# Patient Record
Sex: MALE | Race: BLACK OR AFRICAN AMERICAN
[De-identification: names, ages, dates, MRNs, and addresses within clinical notes are randomized per-mention and may not be internally consistent; named-entity substitution may affect disease eponyms.]

---

## 2018-09-13 ENCOUNTER — HOSPITAL ENCOUNTER (INPATIENT)
Dept: HOSPITAL 92 - ERS | Age: 53
LOS: 15 days | Discharge: HOME HEALTH | DRG: 958 | End: 2018-09-28
Attending: SURGERY | Admitting: SURGERY
Payer: COMMERCIAL

## 2018-09-13 VITALS — BODY MASS INDEX: 40.4 KG/M2

## 2018-09-13 DIAGNOSIS — S71.112A: ICD-10-CM

## 2018-09-13 DIAGNOSIS — I50.32: ICD-10-CM

## 2018-09-13 DIAGNOSIS — G89.11: ICD-10-CM

## 2018-09-13 DIAGNOSIS — T79.6XXA: ICD-10-CM
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
## 2018-09-13 DIAGNOSIS — I51.7: ICD-10-CM

## 2018-09-13 DIAGNOSIS — Z23: ICD-10-CM

## 2018-09-13 DIAGNOSIS — S77.12XA: Primary | ICD-10-CM
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
## 2018-09-13 DIAGNOSIS — N17.9: ICD-10-CM

## 2018-09-13 DIAGNOSIS — I82.432: ICD-10-CM

## 2018-09-13 DIAGNOSIS — F17.220: ICD-10-CM

## 2018-09-13 DIAGNOSIS — R31.9: ICD-10-CM
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
## 2018-09-13 DIAGNOSIS — I11.0: ICD-10-CM

## 2018-09-13 LAB
ALBUMIN SERPL BCG-MCNC: 4.1 G/DL (ref 3.5–5)
ALP SERPL-CCNC: 67 U/L (ref 40–150)
ALT SERPL W P-5'-P-CCNC: 36 U/L (ref 8–55)
ANION GAP SERPL CALC-SCNC: 14 MMOL/L (ref 10–20)
ANION GAP SERPL CALC-SCNC: 14 MMOL/L (ref 10–20)
APTT PPP: 24.5 SEC (ref 22.9–36.1)
AST SERPL-CCNC: 30 U/L (ref 5–34)
BASOPHILS # BLD AUTO: 0 THOU/UL (ref 0–0.2)
BASOPHILS # BLD AUTO: 0.1 THOU/UL (ref 0–0.2)
BASOPHILS NFR BLD AUTO: 0.2 % (ref 0–1)
BASOPHILS NFR BLD AUTO: 0.7 % (ref 0–1)
BILIRUB SERPL-MCNC: 0.6 MG/DL (ref 0.2–1.2)
BUN SERPL-MCNC: 17 MG/DL (ref 8.4–25.7)
BUN SERPL-MCNC: 17 MG/DL (ref 8.4–25.7)
CALCIUM SERPL-MCNC: 8.8 MG/DL (ref 7.8–10.44)
CALCIUM SERPL-MCNC: 9.1 MG/DL (ref 7.8–10.44)
CHLORIDE SERPL-SCNC: 108 MMOL/L (ref 98–107)
CHLORIDE SERPL-SCNC: 109 MMOL/L (ref 98–107)
CK MB SERPL-MCNC: 6.3 NG/ML (ref 0–6.6)
CK MB SERPL-MCNC: 7.4 NG/ML (ref 0–6.6)
CK MB SERPL-MCNC: 7.7 NG/ML (ref 0–6.6)
CK SERPL-CCNC: 708 U/L (ref 30–200)
CO2 SERPL-SCNC: 22 MMOL/L (ref 22–29)
CO2 SERPL-SCNC: 22 MMOL/L (ref 22–29)
COMM CRITICAL RESULTS DOC: (no result)
CREAT CL PREDICTED SERPL C-G-VRATE: 0 ML/MIN (ref 70–130)
CREAT CL PREDICTED SERPL C-G-VRATE: 0 ML/MIN (ref 70–130)
EOSINOPHIL # BLD AUTO: 0 THOU/UL (ref 0–0.7)
EOSINOPHIL # BLD AUTO: 0.1 THOU/UL (ref 0–0.7)
EOSINOPHIL NFR BLD AUTO: 0.2 % (ref 0–10)
EOSINOPHIL NFR BLD AUTO: 1.2 % (ref 0–10)
GLOBULIN SER CALC-MCNC: 2.8 G/DL (ref 2.4–3.5)
GLUCOSE SERPL-MCNC: 120 MG/DL (ref 70–105)
GLUCOSE SERPL-MCNC: 135 MG/DL (ref 70–105)
HGB BLD-MCNC: 13.7 G/DL (ref 14–18)
HGB BLD-MCNC: 14 G/DL (ref 14–18)
INR PPP: 1
LYMPHOCYTES # BLD: 1.9 THOU/UL (ref 1.2–3.4)
LYMPHOCYTES # BLD: 3.4 THOU/UL (ref 1.2–3.4)
LYMPHOCYTES NFR BLD AUTO: 11.2 % (ref 21–51)
LYMPHOCYTES NFR BLD AUTO: 35.7 % (ref 21–51)
MCH RBC QN AUTO: 28.6 PG (ref 27–31)
MCH RBC QN AUTO: 29 PG (ref 27–31)
MCV RBC AUTO: 86.8 FL (ref 78–98)
MCV RBC AUTO: 87.2 FL (ref 78–98)
MONOCYTES # BLD AUTO: 0.6 THOU/UL (ref 0.11–0.59)
MONOCYTES # BLD AUTO: 1.2 THOU/UL (ref 0.11–0.59)
MONOCYTES NFR BLD AUTO: 6.6 % (ref 0–10)
MONOCYTES NFR BLD AUTO: 7.3 % (ref 0–10)
NEUTROPHILS # BLD AUTO: 13.6 THOU/UL (ref 1.4–6.5)
NEUTROPHILS # BLD AUTO: 5.3 THOU/UL (ref 1.4–6.5)
NEUTROPHILS NFR BLD AUTO: 55.8 % (ref 42–75)
NEUTROPHILS NFR BLD AUTO: 81.1 % (ref 42–75)
PLATELET # BLD AUTO: 192 THOU/UL (ref 130–400)
PLATELET # BLD AUTO: 199 THOU/UL (ref 130–400)
POTASSIUM SERPL-SCNC: 3.4 MMOL/L (ref 3.5–5.1)
POTASSIUM SERPL-SCNC: 3.8 MMOL/L (ref 3.5–5.1)
PROTHROMBIN TIME: 13.3 SEC (ref 12–14.7)
RBC # BLD AUTO: 4.73 MILL/UL (ref 4.7–6.1)
RBC # BLD AUTO: 4.9 MILL/UL (ref 4.7–6.1)
SODIUM SERPL-SCNC: 140 MMOL/L (ref 136–145)
SODIUM SERPL-SCNC: 142 MMOL/L (ref 136–145)
TROPONIN I SERPL DL<=0.01 NG/ML-MCNC: 0.1 NG/ML (ref ?–0.03)
TROPONIN I SERPL DL<=0.01 NG/ML-MCNC: 0.12 NG/ML (ref ?–0.03)
TROPONIN I SERPL DL<=0.01 NG/ML-MCNC: 0.12 NG/ML (ref ?–0.03)
WBC # BLD AUTO: 16.8 THOU/UL (ref 4.8–10.8)
WBC # BLD AUTO: 9.5 THOU/UL (ref 4.8–10.8)

## 2018-09-13 PROCEDURE — 76942 ECHO GUIDE FOR BIOPSY: CPT

## 2018-09-13 PROCEDURE — 37191 INS ENDOVAS VENA CAVA FILTR: CPT

## 2018-09-13 PROCEDURE — 85018 HEMOGLOBIN: CPT

## 2018-09-13 PROCEDURE — 84100 ASSAY OF PHOSPHORUS: CPT

## 2018-09-13 PROCEDURE — 93970 EXTREMITY STUDY: CPT

## 2018-09-13 PROCEDURE — 83735 ASSAY OF MAGNESIUM: CPT

## 2018-09-13 PROCEDURE — 86850 RBC ANTIBODY SCREEN: CPT

## 2018-09-13 PROCEDURE — 90715 TDAP VACCINE 7 YRS/> IM: CPT

## 2018-09-13 PROCEDURE — 93005 ELECTROCARDIOGRAM TRACING: CPT

## 2018-09-13 PROCEDURE — 90732 PPSV23 VACC 2 YRS+ SUBQ/IM: CPT

## 2018-09-13 PROCEDURE — 96374 THER/PROPH/DIAG INJ IV PUSH: CPT

## 2018-09-13 PROCEDURE — 96361 HYDRATE IV INFUSION ADD-ON: CPT

## 2018-09-13 PROCEDURE — 86900 BLOOD TYPING SEROLOGIC ABO: CPT

## 2018-09-13 PROCEDURE — 96375 TX/PRO/DX INJ NEW DRUG ADDON: CPT

## 2018-09-13 PROCEDURE — 85049 AUTOMATED PLATELET COUNT: CPT

## 2018-09-13 PROCEDURE — 36415 COLL VENOUS BLD VENIPUNCTURE: CPT

## 2018-09-13 PROCEDURE — 85610 PROTHROMBIN TIME: CPT

## 2018-09-13 PROCEDURE — 80053 COMPREHEN METABOLIC PANEL: CPT

## 2018-09-13 PROCEDURE — 85025 COMPLETE CBC W/AUTO DIFF WBC: CPT

## 2018-09-13 PROCEDURE — G0009 ADMIN PNEUMOCOCCAL VACCINE: HCPCS

## 2018-09-13 PROCEDURE — 86901 BLOOD TYPING SEROLOGIC RH(D): CPT

## 2018-09-13 PROCEDURE — 84484 ASSAY OF TROPONIN QUANT: CPT

## 2018-09-13 PROCEDURE — 96376 TX/PRO/DX INJ SAME DRUG ADON: CPT

## 2018-09-13 PROCEDURE — 85014 HEMATOCRIT: CPT

## 2018-09-13 PROCEDURE — 80048 BASIC METABOLIC PNL TOTAL CA: CPT

## 2018-09-13 PROCEDURE — 71045 X-RAY EXAM CHEST 1 VIEW: CPT

## 2018-09-13 PROCEDURE — 93306 TTE W/DOPPLER COMPLETE: CPT

## 2018-09-13 PROCEDURE — G0390 TRAUMA RESPONS W/HOSP CRITI: HCPCS

## 2018-09-13 PROCEDURE — 85730 THROMBOPLASTIN TIME PARTIAL: CPT

## 2018-09-13 PROCEDURE — 82550 ASSAY OF CK (CPK): CPT

## 2018-09-13 PROCEDURE — 82553 CREATINE MB FRACTION: CPT

## 2018-09-13 PROCEDURE — A4216 STERILE WATER/SALINE, 10 ML: HCPCS

## 2018-09-13 PROCEDURE — 90471 IMMUNIZATION ADMIN: CPT

## 2018-09-13 PROCEDURE — C1769 GUIDE WIRE: HCPCS

## 2018-09-13 PROCEDURE — 0KDR0ZZ EXTRACTION OF LEFT UPPER LEG MUSCLE, OPEN APPROACH: ICD-10-PCS | Performed by: ORTHOPAEDIC SURGERY

## 2018-09-13 RX ADMIN — Medication SCH GM: at 21:53

## 2018-09-13 RX ADMIN — DOCUSATE SODIUM 50 MG AND SENNOSIDES 8.6 MG SCH: 8.6; 5 TABLET, FILM COATED ORAL at 21:49

## 2018-09-13 RX ADMIN — DOCUSATE SODIUM 50 MG AND SENNOSIDES 8.6 MG SCH TAB: 8.6; 5 TABLET, FILM COATED ORAL at 16:28

## 2018-09-13 RX ADMIN — Medication PRN ML: at 21:50

## 2018-09-13 RX ADMIN — Medication PRN ML: at 21:53

## 2018-09-13 RX ADMIN — ASPIRIN SCH MG: 81 TABLET ORAL at 21:48

## 2018-09-13 NOTE — RAD
CHEST 1 VIEW:

 

Date:  09/13/18 

 

HISTORY:  

Preop. 

 

COMPARISON:  

None. 

 

FINDINGS:

The lungs are hypoinflated with vascular crowding. Heart size is enlarged. 

 

There is a linear density projecting in the right hemithorax, may be outside the patient. 

 

IMPRESSION: 

Linear radiodensity projecting in right hemithorax, not definitively within the patient. Recommend re
peat 2 views of chest. 

 

 

POS: [facility]

## 2018-09-13 NOTE — PDOC.EVN
Event Note





- Event Note


Event Note: 





Returned from OR and evaluated.  


Has wound vac in place


SPo2 is 99%, no reported CP and is hungry now. 


IS at bedside, encouraged use


Has full sensation of the extremity. 


Wants to eat, stable vitals. 





Will get AM cbc, bmp and ck for tomorrow. 


Suspend Toradol for now given renal function today


Ct. with pain control


Will transfer from tele soon


No family at bedside to update.

## 2018-09-13 NOTE — CON
DATE OF CONSULTATION:  09/13/2018

 

CHIEF COMPLAINT:  Left leg injury.

 

HISTORY OF PRESENT ILLNESS:  Mr. Bearden is a 53-year-old male who was caught in machinery at work 
last night.  He had a crushing type injury from an industrial machine.  He had a deep laceration over
 lateral and anterior thigh.  He was taken to the emergency department.  He has had a dressing placed
 with Betadine soaked gauze.  He has been admitted to the hospital.  He has had pain control and anti
biotic treatment.  He is resting comfortably.  Last night he was having chest pain and arm pain as we
ll.  He had an elevated troponin.  He reports having intermittent chest pain over the last 1 month.  
He is n.p.o. in preparation for surgery for the leg  later today.  There was no arterial bleeding per
 report and he has been neurologically intact distally.

 

ALLERGIES:  None.

 

MEDICATIONS:  None.

 

PAST MEDICAL HISTORY:  Hypertension.

 

PAST SURGICAL HISTORY:  None.

 

SOCIAL HISTORY:  The patient works as a .  He drinks alcohol as well as uses smokeless tobacco,
 no drug use.

 

FAMILY MEDICAL HISTORY:  Aneurysm.

 

REVIEW OF SYSTEMS:  Positive for left leg pain, otherwise negative 10-point review of systems.

 

PHYSICAL EXAMINATION:

VITAL SIGNS:  Temperature is 99.6, pulse is 82, respiratory rate 20, oxygen saturation is 97%, blood 
pressure 136/62.

GENERAL:  He is alert, lying supine in no apparent distress.

RESPIRATORY:  Breathing comfortably.

ABDOMEN:  Soft, nontender, nondistended.

CARDIOVASCULAR:  Pulses palpable and regular distally.

MUSCULOSKELETAL:  The patient's left leg has a bandage over the thigh.  This was not fully taken down
.  He does have a deep laceration involving the lateral and anterior thigh.  Possible tendon or quadr
iceps involvement.  This tracks deeply to the bony level.  He is neurovascularly intact distally.  He
 has a palpable dorsalis pedis pulse.  He is able flex and extend the foot and ankle.  He feels bal
l sensation in the foot.

 

LABORATORY AND X-RAY FINDINGS:  The patient has a troponin of 0.125.  Coagulation studies are normal.
  Hemoglobin 13.7.

 

IMPRESSION:  Left thigh laceration with recent chest pain and slightly elevated troponin.

 

PLAN:  At this point, the patient will need surgical intervention.  He will need irrigation and debri
mike of his wounds and possible wound closure versus further surgical intervention in the future if
 he has significant contamination.  We are waiting on cardiac evaluation for this.  He does have a sl
ightly elevated troponin and we would like to trend this.  He may need an echocardiogram.  Once he is
 cleared, we will take him urgently to surgery to irrigate his wounds.  He is at risk for infection o
r other complication.  I have reviewed this with him and he wants to proceed.  He is having no active
 chest pain currently.  His pain is controlled.  He will have antibiotic prophylaxis and DVT prophyla
xis.

## 2018-09-13 NOTE — RAD
LEFT FEMUR 2 VIEWS:

 

HISTORY: 

Trauma.

 

COMPARISON: 

None.

 

FINDINGS: 

There is a large degloving injury over the medial thigh and anterior thigh.  No underling osseous def
ect is appreciated.

 

IMPRESSION: 

Large degloving injury of the medial and anterior soft tissues of the thigh.

 

POS: [facility]

## 2018-09-13 NOTE — HP
DATE OF ADMISSION:  2018

 

ATTENDING PHYSICIAN:  Fran Bustillo M.D.

 

TRAUMA ACTIVATION:  Level 2.

 

HISTORY OF PRESENT ILLNESS:  Stan Bearden is a 53-year-old male, who 
presented to [place] [facility] via EMS, status post accidental crush 
injury from an industrial machinery.  The patient is a  at [employer] where he was working this evening on a project.  His leg was crushed and 
cut by one of the pieces of machinery there.  He was brought to the emergency 
room and found to have a large left thigh laceration without any obvious major 
vessel or bony injury.  Orthopedic Surgery was notified and Trauma services was 
asked to admit.  Upon my evaluation, the patient has a chief complaint of 6/10 
left thigh pain.  He is neurovascularly intact distal to the site of his 
injury.  Orthopedic Surgery plans for operative intervention later this 
morning.  He has received tetanus and Ancef in the emergency room.

 

ALLERGIES:  None.

 

HOME MEDICATIONS:  Patient does not currently take any home medications.

 

CHRONIC MEDICAL ILLNESSES:  Hypertension.

 

PAST SURGICAL HISTORY:  The patient denies.

 

SOCIAL HISTORY:  The patient is a .  He endorses drinking up to one fifth 
of liquor on occasional weekends.  He currently dips.  He denies illicit drug 
use.

 

FAMILY HISTORY:  Significant for mother  from intracerebral aneurysm 
and hypertension.

 

REVIEW OF SYSTEMS:  Ten-point review of systems was performed and negative 
except as indicated in the HPI.

 

PHYSICAL EXAMINATION:

VITAL SIGNS:  On presentation, blood pressure 194/109, pulse 83, respirations 24
, O2 sat 91% on 2 liters nasal cannula.

GENERAL:  A well-developed male, in no acute distress, resting in bed.

HEAD:  Normocephalic, atraumatic.

EYES:  Pupils are PERRLA.  Extraocular movements are intact.

NECK:  Supple.  Trachea is midline.  No midline tenderness to palpation.

CHEST:  Atraumatic, nontender to palpation.  Normal work of breathing.  
Symmetric rise.

CARDIOVASCULAR:  Regular rate and rhythm, no obvious murmurs, rubs, or gallops.

GASTROINTESTINAL:  Abdomen is atraumatic, soft, nontender, bowel sounds are 
positive.

MUSCULOSKELETAL:  Pelvis is stable.

BACK EXAM:  Reported as being within normal limits.

EXTREMITIES:  Bilateral upper extremities within normal limits.  Right lower 
extremity within normal limits.  Left lower extremity has a large 25 x 15 cm 
soft tissue laceration.  There is no obvious contamination.  He is 
neurovascularly intact distal to the site of his injury.

NEUROLOGIC:  GCS is 15.  No focal deficit noted.

 

LABORATORY FINDINGS:  WBC 9.5, hemoglobin 14.0, hematocrit 42.5, platelet count 
199.  INR 1.0.  Sodium 142, potassium 3.4, chloride 109, carbon dioxide 22, BUN 
17, creatinine 1.51, glucose 135, AST and ALT within normal limits.  .  
EKG with normal sinus rhythm, nonspecific T-wave changes, and prolonged QT.

 

RADIOGRAPHIC FINDINGS:  Chest x-ray with some cardiomegaly, femur x-ray without 
any obvious bony fracture or dislocation.  Official reads are pending.

 

ASSESSMENT:

1.  Status post industrial machinery accident.

2.  Large soft tissue/left thigh laceration.

3.  Acute traumatic pain.

4.  Hypertension.

5.  Prolonged QT.

6.  Elevated CK.

 

PLAN:

Admit to Trauma services.  The patient should be n.p.o.  Cardiac enzymes for 
nonspecific T-wave changes.  Avoid QT prolonging medications.  Perioperative 
pain management with p.o. and IV analgesics. DVT and gastritis prophylaxis when 
appropriate. Further plan to be delineated once the patient's wound has been 
explored in the operating room.  We will obtain ankle brachial index now to 
rule out any significant vascular injury.  Plan for admission was discussed 
with the patient at bedside and all questions were answered at the time of this 
dictation.  Trauma attending has been notified of admission.

 

LOGAN

## 2018-09-13 NOTE — PRG
DATE OF SERVICE:  09/13/2018

 

DATE OF ADMISSION:  09/13/2018

 

SUBJECTIVE:  Mr. Bearden is admitted overnight for a complex laceration needing surgical debridemen
t by Orthopedic Surgery; however, he did complain of chest pain, noted to have elevated troponin and 
CK prior to arrival.  The patient's troponins have started to downtrend now.  He has no active chest 
pain at this time.  He normally has a normal ADLs and an EF of 65% by echocardiogram with no regional
 wall motion abnormalities today.  The patient does have some grade 2-3 diastolic dysfunction that is
 reported.  The patient was hypertensive on arrival.  He was started on Norvasc and given hydralazine
 and this has since resolved.  The patient is cleared for surgery pending the same.  He still has azul
e pain 8/10 for which morphine has been ordered.  He is currently n.p.o.

 

OBJECTIVE:

VITAL SIGNS:  Currently, temperature is 98.1, blood pressure 123/62.  He is 100% on room air, breathi
ng 19 times a minute with heart rate of 98.  T-max was 100.2 earlier this morning.

GENERAL:  A 53-year-old male lying in bed, in no acute distress.

HEENT:  Normocephalic, atraumatic.

NECK:  Trachea is midline.

RESPIRATORY:  Equal rise and fall.  No respiratory distress.

CARDIOVASCULAR:  Regular rhythm at 98 beats per minute.  Strong pulses in all extremities.  He has se
nsation in all extremities.

ABDOMEN:  Soft and nontender.

EXTREMITIES:  The left extremity has dressing in place.  He does have a sensation distal to his injur
y.  He is able to move all extremities well.

PSYCHIATRIC:  Normal mood and affect.

NEUROLOGIC:  Alert and oriented to person, place, time, and event.  No gross deficits are appreciated
.

 

LABORATORY DATA:  Today shows a white blood cell count of 16.8 with platelets 192, hemoglobin and hem
atocrit 13.7 and 41.3 respectively.  INR is 1.0.  Chemistry shows a creatinine of 1.57, BUN of 17.  C
K was 708, up from 801 on arrival.  Troponins have been 0.123, then 0.125, now 0.103 with a CK-MB xavier
t is downtrending last 6.3.

 

Echocardiogram shows EF of 60%-65%, grade 2-3 diastolic dysfunction, moderate LV hypertrophy.  There 
are some signs that he could have amyloidosis, but no regional wall motion abnormalities.

 

ASSESSMENT:

1.  Status post industrial machinery accident.

2.  Large soft tissue defect to the left thigh with a complex laceration.

3.  Acute traumatic pain.

4.  Hypertension.

5.  Acute diastolic dysfunction.

6.  Acute kidney injury.

7.  Marginally elevated CK.

 

PLAN:

1.  Cleared for surgery.

2.  Continue fluid replacement.

3.  Continue n.p.o. until after surgery.

4.  Increased pain control with morphine and oral agents for now until after surgery.

5.  We will need outpatient follow up for his hypertension and Cardiology.

6.  Change Norvasc and hydralazine to metoprolol 25 mg extended release b.i.d., I have discussed with
 pharmacy.

7.  The beta blocker should assist with a prolonged QT interval on arrival.

8.  GI regimen per protocol.

9.  Prophylaxis will be Pepcid holding chemical DVT for surgery and SCDs to the right leg only.

10.  The patient is a FULL CODE.

11.  Access:  Peripheral IV.

12.  Activity:  Nonweightbearing to the left leg for now.

13.  Diet will be n.p.o. until after surgery.

14.  Disposition:  Can be transferred to a surgical phelan post-surgery.

15.  We have updated the patient and family at the bedside, coordinate care with the Orthopedic team 
and discussed with Echocardiogram and Cardiology team.  This plan is felt to Dr. Sutton and can be upd
ated as needed.

## 2018-09-13 NOTE — CON
DATE OF SERVICE:  09/13/2018

 

REASON FOR CONSULTATION:  Hypertension and abnormal echo.

 

HISTORY OF PRESENT ILLNESS:  Mr. Bearden is a very pleasant 53-year-old  gentleman 
who comes to the hospital for a crush injury to his left leg.  He apparently had his leg caught in an
 industrial machine.  He is a  at [employer].  He was brought in for this and taken to 
the OR earlier today for debridement.  His leg _____ when he has a wound VAC on and this going to [facility] with secondary intention.  During his admission, he voiced complaints of midsternal chest tightness
.  He tells me that he has not had this pain for about 6 months.  He had an episode where he was just
 sitting down similar to this one just chest tightness.  He otherwise takes no medications.  He has katty castañeda told that he has high blood pressure in the past, but he does not take any medications for this.

 

PAST MEDICAL HISTORY:  Hypertension.

 

PAST SURGICAL HISTORY:  None.

 

OUTPATIENT MEDICATIONS:  None.

 

ALLERGIES:  No known drug allergies.

 

SOCIAL HISTORY:  He denies tobacco use; however, he does dip since he was very young.  He drinks abou
t a half bottle of [employer] on the weekends, but none on the weeks.  No drug use.

 

FAMILY HISTORY:  Mother with aneurysm and high blood pressure.

 

REVIEW OF SYSTEMS:  A 12-point review of systems was done and is all negative unless stated in the hi
story of present illness.

 

PHYSICAL EXAMINATION:

VITAL SIGNS:  Temperature as high as 100.2, currently 98.1; pulse 87; respiration rate 18; satting 95
% on room air; blood pressure 154/89.

GENERAL:  Awake, alert, oriented x3, in no distress.

HEENT:  Normocephalic, atraumatic.

NECK:  Supple.

LUNGS:  Lungs are clear.

CARDIOVASCULAR:  S1, S2, no S3, S4, no murmurs.

ABDOMEN:  Soft, positive bowel sounds.

EXTREMITIES:  No edema.

SKIN:  Warm and dry.  Wound VAC on the left leg.

 

LABORATORY WORK:  Reviewed.  White count of 16, hemoglobin of 13, hematocrit 41, platelet count of 19
2.  Coags were reviewed.  Chemistries were reviewed.  Troponins were 0.12, 0.12, and 0.10.  CK-MB at 
7.7, down to 7.4, now at 6.3.  CK-MB was high, currently at 708.  Creatinine 1.5.  Potassium was low 
on admission at 3.4, up to 3.8 now.

 

EKG was reviewed, normal sinus rhythm with LVH.

 

Echocardiogram was reviewed.  He has severe LVH with hyperechoic pattern suggestive of other infiltra
tive diseases.  Differential diagnosis include amyloid sarcoid less likely hemochromatosis.

 

ASSESSMENT AND PLAN:

1.  Hypertension.

2.  Severe LVH and grade II diastolic dysfunction.

3.  Cannot rule out infiltrative diseases.

4.  Crush injury to the left leg, status post debridement.  Wound VAC in place.

5.  Chest pain.

 

PLAN:

1.  Currently, his blood pressure is elevated from multiple factors including pain.  At this time, hi
s pain is better controlled and his blood pressure still in the 150s, so we will plan on starting ant
ihypertensive in the form of lisinopril at 10 mg a day.  If this is not effective, we will switch to 
a diuretic.

2.  In the future, he will need risk stratification with stress testing, but not at this time.  He ha
s to heal his wound for this.

3.  As far as his chest pain is concerned, we will plan on doing a stress test as an outpatient.  He 
may also need a fat pad biopsy in the future.

 

Thank you for letting us participate in the care of your patient.  We will follow.

## 2018-09-14 LAB
ANION GAP SERPL CALC-SCNC: 11 MMOL/L (ref 10–20)
BASOPHILS # BLD AUTO: 0 THOU/UL (ref 0–0.2)
BASOPHILS NFR BLD AUTO: 0.1 % (ref 0–1)
BUN SERPL-MCNC: 25 MG/DL (ref 8.4–25.7)
CALCIUM SERPL-MCNC: 8.3 MG/DL (ref 7.8–10.44)
CHLORIDE SERPL-SCNC: 108 MMOL/L (ref 98–107)
CK SERPL-CCNC: 1677 U/L (ref 30–200)
CO2 SERPL-SCNC: 24 MMOL/L (ref 22–29)
CREAT CL PREDICTED SERPL C-G-VRATE: 93 ML/MIN (ref 70–130)
EOSINOPHIL # BLD AUTO: 0 THOU/UL (ref 0–0.7)
EOSINOPHIL NFR BLD AUTO: 0.1 % (ref 0–10)
GLUCOSE SERPL-MCNC: 107 MG/DL (ref 70–105)
HGB BLD-MCNC: 11.3 G/DL (ref 14–18)
LYMPHOCYTES # BLD: 1.1 THOU/UL (ref 1.2–3.4)
LYMPHOCYTES NFR BLD AUTO: 13.9 % (ref 21–51)
MAGNESIUM SERPL-MCNC: 2.4 MG/DL (ref 1.6–2.6)
MCH RBC QN AUTO: 28.6 PG (ref 27–31)
MCV RBC AUTO: 87.3 FL (ref 78–98)
MONOCYTES # BLD AUTO: 1.1 THOU/UL (ref 0.11–0.59)
MONOCYTES NFR BLD AUTO: 13.1 % (ref 0–10)
NEUTROPHILS # BLD AUTO: 5.9 THOU/UL (ref 1.4–6.5)
NEUTROPHILS NFR BLD AUTO: 72.8 % (ref 42–75)
PLATELET # BLD AUTO: 170 THOU/UL (ref 130–400)
POTASSIUM SERPL-SCNC: 4.3 MMOL/L (ref 3.5–5.1)
RBC # BLD AUTO: 3.96 MILL/UL (ref 4.7–6.1)
SODIUM SERPL-SCNC: 139 MMOL/L (ref 136–145)
WBC # BLD AUTO: 8.1 THOU/UL (ref 4.8–10.8)

## 2018-09-14 RX ADMIN — Medication SCH GM: at 13:48

## 2018-09-14 RX ADMIN — Medication PRN ML: at 11:53

## 2018-09-14 RX ADMIN — ASPIRIN SCH MG: 81 TABLET ORAL at 09:18

## 2018-09-14 RX ADMIN — Medication SCH GM: at 05:52

## 2018-09-14 RX ADMIN — DOCUSATE SODIUM 50 MG AND SENNOSIDES 8.6 MG SCH TAB: 8.6; 5 TABLET, FILM COATED ORAL at 20:49

## 2018-09-14 RX ADMIN — Medication SCH GM: at 20:49

## 2018-09-14 RX ADMIN — DOCUSATE SODIUM 50 MG AND SENNOSIDES 8.6 MG SCH TAB: 8.6; 5 TABLET, FILM COATED ORAL at 09:18

## 2018-09-14 RX ADMIN — ASPIRIN SCH MG: 81 TABLET ORAL at 20:50

## 2018-09-14 NOTE — PRG
DATE OF SERVICE:  09/14/2018

 

SUBJECTIVE:  Mr. Bearden was at his hospital day #1, postop day #1, status 
post crush injury machinery to the left thigh, causing a significant soft 
tissue damage.  The patient was taken to the operating room yesterday with Dr. Wilson from Orthopedic Surgery and had a wound washout.  An irrigating wound 
VAC was in place.  The patient was moved back to the surgical phelan.  Initially, 
was on tele.  Troponins started to downtrend.  Echo showed normal EF with a 
grade 1 to 2 diastolic dysfunction.  He has no active chest pain.  Therefore, 
he was moved to the surgical phelan.  The patient is doing better, tolerating a 
diet, minimal distress.  He is working with PT.  CK was noted to be slightly 
elevated today and creatinine continues to climb just ever so slightly.  The 
patient is tolerating p.o. and producing urine.  Plastic Surgery has been 
consulted.

 

OBJECTIVE:

VITAL SIGNS:  Blood pressure is 146/75, temperature is 98.5, respirations 16, 
pulse is 80.  He is 96% on room air.

GENERAL:  This is a 53-year-old male, slightly obese, sitting up in bed, in no 
acute distress.

HEENT:  Normocephalic, atraumatic.

NECK:  Trachea is midline.

RESPIRATORY:  Equal rise and fall.  No respiratory distress.

CARDIOVASCULAR:  Strong pulses noted.

ABDOMEN:  Soft.

EXTREMITIES:  He has a wound VAC in place to the left thigh.  Does have a 
sensation distal to the wound.  Otherwise, no edema appreciated.

PSYCHIATRIC:  Normal mood and affect.

SKIN:  Pink, warm, and dry.

 

LABORATORY DATA:  Significant today, a white blood cell count has come down to 8
,100, platelets are 170.  Creatinine is 1.63 and CK is 1677.

 

ASSESSMENT AND PLAN:

1.  Status post industrial machinery accident.

2.  Large soft tissue defect to left thigh, status post debridement and wound 
VAC placement.

3.  Acute traumatic pain.

4.  Hypertension, slowly improving.

5.  Acute diastolic dysfunction.

6.  Acute kidney injury.

7.  Mild rhabdomyolysis.

 

PLAN:

1.  Plastic Surgery consult.

2.  Stop medications that are renally metabolized.

3.  Continue pain control.

4.  Continue intravenous fluids for now.

5.  Repeat BMP and CK in the morning.

6.  GI regimen per protocol.

7.  Increase beta blocker to b.i.d. dosing from once daily dosing.

8.  Monitor blood pressure.

9.  Access peripheral IVs.

 

ACTIVITY:  Nonweightbearing on the leg, but will work with PT.  Patient is a 
FULL CODE.

 

DIET:  Regular diet.

 

DISPOSITION:  Continue services on the surgical phelan.

 

Prophylaxis will be aspirin b.i.d., Pepcid renally dosed, and SCDs to the non-
affected leg.

I have updated the patient and the patient's wife at the bedside and answered 
all questions.  I have coordinated care with the nursing staff, the trauma team
, and Orthopedic team.

 

LOGAN

## 2018-09-14 NOTE — OP
DATE OF PROCEDURE:  09/13/2018

 

PREOPERATIVE DIAGNOSIS:  Traumatic laceration, left medial thigh.

 

POSTOPERATIVE DIAGNOSES:  Left medial thigh laceration with laceration of the vastus medialis oblique
 muscle and partial laceration of rectus femoris.

 

PROCEDURE:

1.  Irrigation of left thigh wound.

2.  Application of wound VAC to left thigh.

 

ANESTHESIA:  General.

 

SURGEON:  Ramsey Wilson M.D.

 

ASSISTANT:  Lázaro Bourne PA-C.

 

BLOOD LOSS:  150 mL.

 

IMPLANTS:  None.

 

DRAINS:  Wound VAC x1.

 

COMPLICATIONS:  None.

 

SPECIMEN:  None.

 

OUTCOME FINDINGS:  Irrigated wound found to be free of foreign debris with application of wound VAC.

 

INDICATIONS:  Mr. Bearden is a 53-year-old gentleman involved in a work injury with a large traumat
ic laceration of the left medial thigh, heading deep to the fascia and into the vastus medialis muscl
e belly.  The patient initially was having some chest pain and as such, we had to delay surgery for c
learance.  He has now been cleared and is proceeding to the operating room for irrigation, debridemen
t, exploration of the wound and probable application of wound VAC.  Informed consent has been obtaine
d.  I believe all questions have been answered.

 

DESCRIPTION OF PROCEDURE:  The patient was brought to the operating room and a timeout performed foll
owed by induction of general anesthesia.  The patient was found to have a very large traumatic wound 
of the medial thigh.  This measuring in excess of 14 inches long with a width of approximately 8 inch
es.  On initial inspection following the sterile prep and drape, the patient was found to have expose
d vastus medialis muscle belly with essentially complete transection of the muscle belly proximal to 
the quadriceps tendon.  This laceration then extended to just get the medial most portion of the rect
us femoris.  There did appear to be perhaps some loss of muscle tissue and the fascia was severely fr
ayed to the point where a fascial closure could not be performed over the muscle belly.  The wound wa
s further inspected.  There was no exposed bone.  The majority of the quadriceps tendon was completel
y intact.  I did not encounter any foreign debris.  Next, 5 liters of normal saline with Pulsavac was
 irrigated through the wound.  No sharp debridement was necessary due to the fact that there really w
as no obviously dysvascular tissue.  At the completion of the irrigation and exploration, we opted to
 proceed with placement of a wound VAC.  This due to the fact that the skin edge margins were margina
lly viable, still felt to be bleeding, but it was felt that attempt to closure of this thigh would re
sult in excessive tension of the skin and probable skin breakdown and necrosis, as such a wound VAC w
as applied while in the operating room and then patient was transferred to recovery room in stable co
ndition.  There were no complications.  He tolerated the procedure well.

## 2018-09-14 NOTE — PDOC.CTH
Cardiology Progress Note





- Subjective





He is doing well. Pain well controlled. 





- Objective


 Vital Signs











  Temp Pulse Resp BP Pulse Ox


 


 09/14/18 04:01  98.5 F  80  16  146/75 H  96








 











Weight                         273 lb 14.4 oz














 











 09/13/18 09/14/18 09/15/18





 06:59 06:59 06:59


 


Intake Total 60 854 


 


Output Total 400 720 


 


Balance -340 134 














- Physical Examination


General/Neuro: alert & oriented x3, NAD


Neck: no JVD present


Lungs: CTA, unlabored respirations


Heart: RRR


Abdomen: NT/ND


Extremities: other: (No edema. Good distal pulses.)





- Labs


Result Diagrams: 


 09/17/18 05:21





 09/17/18 05:21


 Troponin/CKMB











CK-MB (CK-2)  6.3 ng/mL (0-6.6)   09/13/18  07:11    


 


Troponin I  0.103 ng/mL (< 0.028)  H  09/13/18  07:11    














- Assessment/Plan





1. HTN, poorly controlled


2. LVH, possible infiltrative disease.


3. S/P crush injury to left leg. 





PLAN:


- Will start HCTZ for BP control. 


- Outpatient cardiac work up.

## 2018-09-15 LAB
ANION GAP SERPL CALC-SCNC: 11 MMOL/L (ref 10–20)
BUN SERPL-MCNC: 19 MG/DL (ref 8.4–25.7)
CALCIUM SERPL-MCNC: 8.3 MG/DL (ref 7.8–10.44)
CHLORIDE SERPL-SCNC: 109 MMOL/L (ref 98–107)
CK SERPL-CCNC: 1537 U/L (ref 30–200)
CO2 SERPL-SCNC: 24 MMOL/L (ref 22–29)
CREAT CL PREDICTED SERPL C-G-VRATE: 125 ML/MIN (ref 70–130)
GLUCOSE SERPL-MCNC: 100 MG/DL (ref 70–105)
POTASSIUM SERPL-SCNC: 3.8 MMOL/L (ref 3.5–5.1)
SODIUM SERPL-SCNC: 140 MMOL/L (ref 136–145)

## 2018-09-15 RX ADMIN — Medication SCH GM: at 06:16

## 2018-09-15 RX ADMIN — Medication SCH GM: at 14:30

## 2018-09-15 RX ADMIN — DOCUSATE SODIUM 50 MG AND SENNOSIDES 8.6 MG SCH: 8.6; 5 TABLET, FILM COATED ORAL at 20:20

## 2018-09-15 RX ADMIN — ASPIRIN SCH MG: 81 TABLET ORAL at 20:14

## 2018-09-15 RX ADMIN — DOCUSATE SODIUM 50 MG AND SENNOSIDES 8.6 MG SCH TAB: 8.6; 5 TABLET, FILM COATED ORAL at 08:58

## 2018-09-15 RX ADMIN — ASPIRIN SCH MG: 81 TABLET ORAL at 08:57

## 2018-09-15 NOTE — PRG
DATE OF SERVICE:  09/15/2018

 

SUBJECTIVE:  The patient is postop day #2 status post crush injury to his left thigh which he underwe
nt excision debridement and wound VAC placement.  The patient is currently on IV antibiotics.  He is 
tolerating a diet.  His pain is controlled.  The patient had no issues overnight.  His CK is trending
 down slightly and his kidney function has improved.

 

PHYSICAL EXAMINATION:

VITAL SIGNS:  Temperature is 99.0, heart rate 85, blood pressure 179/79, respirations 20, oxygen satu
ration 94% on room air.

GENERAL:  The patient is resting comfortably in a chair at bedside.  He is awake, alert, oriented x3.
  [place] coma scale is 15.

HEENT:  Unremarkable.

LUNGS:  Clear to auscultation with good inspiratory and expiratory effort.

HEART:  Regular rate and rhythm.

ABDOMEN:  Soft, flat, nontender with active bowel sounds.

EXTREMITIES:  Neurovascularly intact x4.  Wound VAC to left thigh appears to be functioning properly.


 

ASSESSMENT AND PLAN:

1.  Status post large soft tissue defect of left thigh.

2.  Status post irrigation and debridement, and wound VAC placement of same.

 

Plan will be to continue IV antibiotics today, but they will be discontinued this evening.  Continue 
IV hydration.  We will recheck his CK in the morning.  Continue physical and occupational therapy, pa
in control, pulmonary toilet, gastritis, and mechanical VTE prophylaxis.  Likely the patient will be 
started on chemical VTE prophylaxis tomorrow pending his lab results.

## 2018-09-16 RX ADMIN — DOCUSATE SODIUM 50 MG AND SENNOSIDES 8.6 MG SCH TAB: 8.6; 5 TABLET, FILM COATED ORAL at 19:45

## 2018-09-16 RX ADMIN — ASPIRIN SCH MG: 81 TABLET ORAL at 19:46

## 2018-09-16 RX ADMIN — ASPIRIN SCH MG: 81 TABLET ORAL at 08:42

## 2018-09-16 RX ADMIN — DOCUSATE SODIUM 50 MG AND SENNOSIDES 8.6 MG SCH TAB: 8.6; 5 TABLET, FILM COATED ORAL at 08:42

## 2018-09-17 LAB
ANION GAP SERPL CALC-SCNC: 13 MMOL/L (ref 10–20)
BASOPHILS # BLD AUTO: 0 THOU/UL (ref 0–0.2)
BASOPHILS NFR BLD AUTO: 0.1 % (ref 0–1)
BUN SERPL-MCNC: (no result) MG/DL (ref 8.4–25.7)
CALCIUM SERPL-MCNC: 8.7 MG/DL (ref 7.8–10.44)
CHLORIDE SERPL-SCNC: 104 MMOL/L (ref 98–107)
CK SERPL-CCNC: 1346 U/L (ref 30–200)
CO2 SERPL-SCNC: 23 MMOL/L (ref 22–29)
CREAT CL PREDICTED SERPL C-G-VRATE: 103 ML/MIN (ref 70–130)
EOSINOPHIL # BLD AUTO: 0.1 THOU/UL (ref 0–0.7)
EOSINOPHIL NFR BLD AUTO: 0.9 % (ref 0–10)
GLUCOSE SERPL-MCNC: 90 MG/DL (ref 70–105)
HGB BLD-MCNC: 11.1 G/DL (ref 14–18)
LYMPHOCYTES # BLD: 1.7 THOU/UL (ref 1.2–3.4)
LYMPHOCYTES NFR BLD AUTO: 13.6 % (ref 21–51)
MAGNESIUM SERPL-MCNC: 2.2 MG/DL (ref 1.6–2.6)
MCH RBC QN AUTO: 27.9 PG (ref 27–31)
MCV RBC AUTO: 90 FL (ref 78–98)
MONOCYTES # BLD AUTO: 1.7 THOU/UL (ref 0.11–0.59)
MONOCYTES NFR BLD AUTO: 13.2 % (ref 0–10)
NEUTROPHILS # BLD AUTO: 9.1 THOU/UL (ref 1.4–6.5)
NEUTROPHILS NFR BLD AUTO: 72.2 % (ref 42–75)
PLATELET # BLD AUTO: 228 THOU/UL (ref 130–400)
POTASSIUM SERPL-SCNC: 4 MMOL/L (ref 3.5–5.1)
RBC # BLD AUTO: 3.97 MILL/UL (ref 4.7–6.1)
SODIUM SERPL-SCNC: 136 MMOL/L (ref 136–145)
WBC # BLD AUTO: 12.6 THOU/UL (ref 4.8–10.8)

## 2018-09-17 RX ADMIN — ASPIRIN SCH MG: 81 TABLET ORAL at 20:07

## 2018-09-17 RX ADMIN — ASPIRIN SCH MG: 81 TABLET ORAL at 08:46

## 2018-09-17 RX ADMIN — DOCUSATE SODIUM 50 MG AND SENNOSIDES 8.6 MG SCH: 8.6; 5 TABLET, FILM COATED ORAL at 08:48

## 2018-09-17 RX ADMIN — DOCUSATE SODIUM 50 MG AND SENNOSIDES 8.6 MG SCH TAB: 8.6; 5 TABLET, FILM COATED ORAL at 20:07

## 2018-09-17 NOTE — PDOC.CTH
Cardiology Progress Note





- Subjective





He is doing well. His BP is still high. He is able to walk with a walker to the 

bathroom and has been walking with PT. 





- Objective


 Vital Signs











  Temp Pulse Resp BP BP BP BP


 


 09/17/18 11:21  98.5 F  92  20    157/88 H 


 


 09/17/18 09:10     164/85 H  167/80 H  


 


 09/17/18 07:26  98.5 F  91  18     164/85 H


 


 09/17/18 05:33       


 


 09/17/18 01:00  98.9 F  76  16     165/96 H














  Pulse Ox


 


 09/17/18 11:21  95


 


 09/17/18 09:10 


 


 09/17/18 07:26  96


 


 09/17/18 05:33  97


 


 09/17/18 01:00  97








 











Weight                         273 lb 14.4 oz














 











 09/16/18 09/17/18 09/18/18





 06:59 06:59 06:59


 


Intake Total 4400 4010 


 


Balance 4400 4010 














- Physical Examination


General/Neuro: alert & oriented x3, NAD


Neck: no JVD present


Lungs: unlabored respirations


Heart: RRR


Abdomen: NT/ND


Extremities: + edema B (1+ left leg.)





- Labs


Result Diagrams: 


 09/17/18 05:21





 09/17/18 05:21


 Troponin/CKMB











CK-MB (CK-2)  6.3 ng/mL (0-6.6)   09/13/18  07:11    


 


Troponin I  0.103 ng/mL (< 0.028)  H  09/13/18  07:11    














- Assessment/Plan





1. HTN, poorly controlled


2. LVH, possible infiltrative disease.


3. S/P crush injury to left leg. 





PLAN:


- Increase HCTZ and add nifedipine. 


- Will need fat pad biopsy in the future and stress testing once leg improved.

## 2018-09-17 NOTE — PRG
DATE OF SERVICE:  09/16/2018

 

HOSPITAL COURSE:  The patient is postop day #3 status post crush injury to his left thigh, which he u
nderwent excisional debridement and wound VAC placement.  The patient completed his antibiotic treatm
ent yesterday.  He had no issues overnight.  He has been tolerating a diet.  His pain is controlled, 
and he has worked with physical and occupational therapy.

 

PHYSICAL EXAMINATION:

VITAL SIGNS:  Temperature is 98.6, heart rate 86, blood pressure 151/89, respirations 16, oxygen satu
ration 95% on room air.

GENERAL:  The patient is resting comfortably, sitting in a chair beside his bed.  He is awake, alert,
 and oriented x3.  [place] coma scale is 15.

HEENT:  Unremarkable.

LUNGS:  Clear to auscultation with good inspiratory and expiratory effort.

HEART:  Regular rate and rhythm.

ABDOMEN:  Soft, flat, nontender with active bowel sounds.

EXTREMITIES:  Neurovascularly intact x4.  Wound VAC appears to be functioning correctly.

 

LABORATORY DATA AND X-RAY FINDINGS:  There are no labs or radiographs to review this morning.

 

ASSESSMENT AND PLAN:

1.  Status post large soft tissue defect of the left thigh.

2.  Status post irrigation and debridement and wound VAC placement of same.  We will continue support
sharon care, pain control, and we will work on placement decision.

## 2018-09-17 NOTE — PRG-2
DATE OF SERVICE:  09/17/2018

 

HISTORY:  This is a 53-year-old male status post industrial injury with a left leg laceration.  Posto
p day 4 debridement of that wound.  Currently, has wound VAC.  This morning the patient states that h
is pain is minimal at rest, but significantly worse with palpation or with movement.  The patient den
ies any shortness of breath, chest pain, nausea, vomiting, abdominal pain.

 

OBJECTIVE:

VITAL SIGNS:  Temperature 98.5, pulse 92, respirations 18, O2 saturation 95 on room air, blood pressu
re 157/88.

GENERAL:  The patient is sitting in his chair watching TV in no acute distress.

LUNGS:  Clear to auscultation with no labored breathing.

CARDIOVASCULAR:  Regular rate and rhythm with no murmurs.

ABDOMEN:  Soft, nontender and has active bowel sounds.

EXTREMITIES:  Neurovascularly intact x4.  Wound VAC is in place.  The patient has significant swellin
g on the left leg at the site of the injury with some erythema surrounding.

 

LABORATORY DATA:  WBC 12.5, hemoglobin 11.1, hematocrit 35.7, platelet count 228.  Sodium 136, potass
ium 4.0, chloride 104, bicarbonate 23, BUN less than 4, creatinine 1.48, glucose 90, creatinine kinas
e 1346 down from 1537.

 

ASSESSMENT:

1.  Status post large soft tissue injury to left thigh.

2.  Status post irrigation and debridement with wound VAC placement.

 

PLAN:  We will continue providing pain support and other supportive measures.  The patient will laina
nue working with PT, OT as we find placement for patient.

 

Dr. Sutton saw this patient.  We discussed the treatment plan.

## 2018-09-18 LAB
HGB BLD-MCNC: 10.5 G/DL (ref 14–18)
PLATELET # BLD AUTO: 277 THOU/UL (ref 130–400)

## 2018-09-18 PROCEDURE — 06H03DZ INSERTION OF INTRALUMINAL DEVICE INTO INFERIOR VENA CAVA, PERCUTANEOUS APPROACH: ICD-10-PCS | Performed by: THORACIC SURGERY (CARDIOTHORACIC VASCULAR SURGERY)

## 2018-09-18 RX ADMIN — ASPIRIN SCH: 81 TABLET ORAL at 08:17

## 2018-09-18 RX ADMIN — DOCUSATE SODIUM 50 MG AND SENNOSIDES 8.6 MG SCH: 8.6; 5 TABLET, FILM COATED ORAL at 20:58

## 2018-09-18 RX ADMIN — DOCUSATE SODIUM 50 MG AND SENNOSIDES 8.6 MG SCH: 8.6; 5 TABLET, FILM COATED ORAL at 08:23

## 2018-09-18 RX ADMIN — NIFEDIPINE SCH MG: 30 TABLET, FILM COATED, EXTENDED RELEASE ORAL at 08:19

## 2018-09-18 RX ADMIN — HYDROCODONE BITARTRATE AND ACETAMINOPHEN PRN TAB: 7.5; 325 TABLET ORAL at 20:59

## 2018-09-18 NOTE — CON
DATE OF CONSULTATION:  09/18/2018

 

REQUESTING PHYSICIAN:  Dr. Sutton.

 

CHIEF COMPLAINT:  Deep venous thrombosis with contraindication to anticoagulation.

 

HISTORY OF PRESENT ILLNESS:  The patient is a 53-year-old black man with minimal past medical history
.  Five days ago, he was involved in an industrial accident where there was a crush injury to his lef
t thigh that left him with a large soft tissue defect is currently being managed with wound VAC, but 
plans were for split thickness autologous skin grafting for coverage.  The patient had been having so
me persistent swelling in that leg and an ultrasound demonstrated deep venous thrombosis in the left 
popliteal vein.  Anticoagulation interfered with adherence of the skin graft at the wound site, and I
 have been requested to place a temporary vena cava filter to guard against pulmonary embolism during
 this window of time, during which anticoagulation will be deferred.

 

PAST MEDICAL HISTORY:  Significant for hypertension.

 

MEDICATIONS:  He takes no medicines on a regular basis.  His present medicines include Toprol-XL, hyd
rochlorothiazide, and Procardia-XL as well as pain medicine.

 

ALLERGIES:  He denies any medical allergies.

 

PHYSICAL EXAMINATION:

GENERAL:  He is robust appearing man in no distress.  He has a wound VAC in place in the left thigh. 
 He is 5 feet 9.  Weighs 273 pounds, heart rate 78, blood pressure 150/81, temperature 98.5, room air
 sats 93%.

LUNGS:  Clear breath sounds.

CARDIOVASCULAR:  Regular rate and rhythm.

ABDOMEN:  Soft and nontender.  He has a large soft tissue defect encompassing much of the anteromedia
l aspect of his left thigh with some swelling associated with it.

 

His venous Doppler shows left popliteal DVT.

 

LABORATORY DATA:  His creatinines have been running in the 1.2 to 1.6 range, yesterday his BUN was le
ss than 4 and his creatinine is 1.48.

 

ASSESSMENT AND PLAN:  We will place Optease vena cava filter with plan to attempt removal in about 2 
weeks' time.

## 2018-09-18 NOTE — PDOC.CTH
Cardiology Progress Note





- Subjective


Doing well. His biggest concern is leg edema. he had an US and it showed a DVT 

on left leg. 





- Objective


 Vital Signs











  Temp Pulse Resp BP BP Pulse Ox


 


 09/18/18 07:25  98.5 F  77  18  147/88 H   96


 


 09/18/18 03:46  98.2 F  72  18   138/87  94 L








 











Weight                         273 lb 14.4 oz














 











 09/17/18 09/18/18 09/19/18





 06:59 06:59 06:59


 


Intake Total 4010 1930 


 


Balance 4010 1930 














- Physical Examination


General/Neuro: alert & oriented x3, NAD


Neck: no JVD present


Lungs: CTA, unlabored respirations


Heart: RRR


Abdomen: NT/ND


Extremities: other: (2+ edema left leg.)





- Labs


Result Diagrams: 


 09/17/18 05:21





 09/17/18 05:21


 Troponin/CKMB











CK-MB (CK-2)  6.3 ng/mL (0-6.6)   09/13/18  07:11    


 


Troponin I  0.103 ng/mL (< 0.028)  H  09/13/18  07:11    














- Assessment/Plan





1. HTN, poorly controlled


2. LVH, possible infiltrative disease.


3. S/P crush injury to left leg. 


4. Left leg DVT


5. Hematuria





PLAN:


- BP better controlled with current regimen. Will continue for now. 


- Will need fat pad biopsy in the future and stress testing once leg improved. 


- I discussed with Dr. Sutton about starting full anticoagulation and will start 

Heparin drip. Will stop Lovenox.

## 2018-09-18 NOTE — ULT
BILATERAL LOWER EXTREMITY VENOUS DUPLEX EXAM:

 

Date:  09/18/18 

 

HISTORY:  

Recent trauma. Leg swelling. Examination of left leg is somewhat limited due to bandage material, whi
ch obscures some of the detail in the distal left superficial femoral vein and posterior tibial veins
. 

 

FINDINGS:

Real-time color Doppler of right and left lower extremity was performed from groin to calf. This incl
udes evaluation of the common femoral, superficial and profunda femoral, saphenous, popliteal, and tr
ifurcation veins. 

 

On the right side, there is a patent deep venous system with normal compressibility and augmentation.
 

 

On the left side, there is thrombus demonstrated in the more distal popliteal vein. Proximal to this 
level, I do not see any definite deep venous thrombosis, but the more distal superficial femoral vein
 is not imaged. 

 

IMPRESSION: 

1.  Evidence of deep venous thrombosis involving the left popliteal vein as discussed above. 

 

2.  No evidence of deep venous thrombosis of the right lower extremity. 

 

 

POS: [facility]

## 2018-09-18 NOTE — PRG-2
DATE OF SERVICE:  09/18/2018

 

SUBJECTIVE:  This is a 53-year-old male status post work injury of left leg laceration hospital day #
5, postop day 4 for washout of that wound.  Currently has a wound VAC.  This morning, the patient sta
love that his pain has been minimal.  He states that he was able to work with physical therapy with mi
ld pain.  The patient denies any shortness of breath, chest pain, nausea, vomiting.

 

OBJECTIVE:

VITAL SIGNS:  Temperature 98.5, heart rate 77, respirations 18, pulse ox 96 on room air, blood pressu
re 147/88.

GENERAL:  The patient is sitting in a chair watching TV in no acute distress.

LUNGS:  Clear to auscultation with nonlabored breathing.

CARDIOVASCULAR:  Regular rate and rhythm with no murmurs.

ABDOMEN:  Soft, nontender with active bowel sounds.

EXTREMITIES:  Neurovascularly intact x4.  Wound VAC is in place.  Swelling appears improved on affect
ed leg.

 

LABORATORY DATA:  No new values today.

 

ASSESSMENT:

1.  Status post large soft tissue injury.  

2.  Status post irrigation and debridement of wound with wound VAC placement postoperative day 4. 

3.  Acute pain secondary to above.

 

PLAN:  We will continue provide him supportive care as well as pain management.  The patient is on GI
 and DVT prophylaxis.  The patient will continue working with PT and OT.  The patient can perform min
imal toe touching with affected leg.  He will continue working with physical therapy.   
is currently working on outpatient wound care placement, we appreciate their recommendations.

 

Dr. Sutton saw this patient.  We discussed the treatment and plan.

## 2018-09-19 PROCEDURE — 0KBR0ZZ EXCISION OF LEFT UPPER LEG MUSCLE, OPEN APPROACH: ICD-10-PCS | Performed by: PLASTIC SURGERY

## 2018-09-19 RX ADMIN — HYDROCODONE BITARTRATE AND ACETAMINOPHEN PRN TAB: 7.5; 325 TABLET ORAL at 20:50

## 2018-09-19 RX ADMIN — NIFEDIPINE SCH MG: 30 TABLET, FILM COATED, EXTENDED RELEASE ORAL at 09:10

## 2018-09-19 RX ADMIN — DOCUSATE SODIUM 50 MG AND SENNOSIDES 8.6 MG SCH: 8.6; 5 TABLET, FILM COATED ORAL at 20:59

## 2018-09-19 RX ADMIN — DOCUSATE SODIUM 50 MG AND SENNOSIDES 8.6 MG SCH TAB: 8.6; 5 TABLET, FILM COATED ORAL at 09:11

## 2018-09-19 NOTE — OP
DATE OF PROCEDURE:  09/18/2018

 

PROCEDURES PERFORMED:  OptEase inferior vena caval filter placement with inferior vena cavography and
 intubation of right and left renal veins.

 

PREOPERATIVE DIAGNOSIS:  Left popliteal vein deep venous thrombosis with contraindication to anticoag
ulation.

 

POSTOPERATIVE DIAGNOSIS:  Left popliteal vein deep venous thrombosis with contraindication to anticoa
gulation.

 

SURGEON:  Ben Mittal MD

 

ANESTHESIA:  1% lidocaine of local anesthesia.

 

INDICATIONS:  The patient is a 53-year-old black man with a large soft tissue defect after an industr
ial accident where there was a crush injury to his left thigh.  He has developed DVT in his left popl
iteal vein and a vena cava filter is being placed because of contraindications to anticoagulation due
 to plans for skin graft coverage of his wound.  Temporary filter is being used in anticipation of vo
lume relatively short window of time with contraindication to anticoagulation.

 

FINDINGS:  Fluoroscopy 11.3 minutes, contrast 16 mL of Isovue, the left renal vein is at the mid uppe
r body of L1 and the right renal vein is at the upper edge of L2.  The tip of the device was position
ed at the upper edge of L2.

 

NARRATIVE REPORT:  After informed consent was obtained, the patient was taken to the cath lab and kimmy
larry in supine position on the cath table.  His groins were prepped and draped in sterile fashion.  Ul
trasonography was used to identify right renal vein and not confirm its compressibility.  Lidocaine w
as infiltrated in the skin and subcutaneous tissues at that level and then a large bore catheter was 
used under ultrasonic guidance to cannulate the right renal vein, a guidewire was placed and then adv
anced under fluoroscopy.  The needle was removed and the tract was dilated with a sheath being placed
 under fluoroscopy with the dilator in place.  Contrast was injected, the initial injection failed to
 show any suggestion of the left or right renal veins.  A second injection was performed which identi
fied the left renal vein, although it was higher than had been anticipated at the upper portion of th
e L1 body because of some mild renal insufficiency was opted to probe for the renal veins rather than
 continuing with contrast injections.  Using a series of wires and catheters, the renal veins were pr
chun.  The left renal vein was relatively easy to intubate with the wire.  The right renal vein was h
arder to identify.  Finally, an additional injection was performed which gave a suggestion of its ethan
fice at the upper edge of L1.  This area was then probed and successfully intubated.  Once that had b
een done, the catheter and guidewire were removed and the OptEase femoral insertion and removal, rex
gned filter was advanced through the sheath up to the upper edge of L2.  The sheath was then withdraw
n as the device was held in place to allow for its deployment and a still photo was obtained, documen
ting positioning of the device and confirming the inferior orientation of the hook.  Hemostasis was o
btained with direct pressure after removal of the sheath.  Then, the patient was taken to the recover
y area in stable condition.

## 2018-09-20 LAB
HGB BLD-MCNC: 10.3 G/DL (ref 14–18)
HGB BLD-MCNC: 10.7 G/DL (ref 14–18)
PLATELET # BLD AUTO: 386 THOU/UL (ref 130–400)
PLATELET # BLD AUTO: 426 THOU/UL (ref 130–400)

## 2018-09-20 RX ADMIN — DOCUSATE SODIUM 50 MG AND SENNOSIDES 8.6 MG SCH TAB: 8.6; 5 TABLET, FILM COATED ORAL at 08:37

## 2018-09-20 RX ADMIN — DOCUSATE SODIUM 50 MG AND SENNOSIDES 8.6 MG SCH TAB: 8.6; 5 TABLET, FILM COATED ORAL at 20:48

## 2018-09-20 RX ADMIN — HYDROCODONE BITARTRATE AND ACETAMINOPHEN PRN TAB: 7.5; 325 TABLET ORAL at 16:35

## 2018-09-20 RX ADMIN — NIFEDIPINE SCH MG: 30 TABLET, FILM COATED, EXTENDED RELEASE ORAL at 08:39

## 2018-09-20 NOTE — OP
DATE OF PROCEDURE:  09/19/2018

 

SURGEON:  Dr. Lele Enriquez

 

PREOPERATIVE DIAGNOSIS:  Open wound, left thigh.

 

POSTOPERATIVE DIAGNOSIS:  Open wound, left thigh.

 

OPERATIVE FINDINGS:  Nonviable vastus lateralis and nonviable left thigh skin.

 

INDICATIONS FOR PROCEDURE:  The patient is a 53-year-old male who was involved in an industrial crush
 injury of his left thigh.  He has been taken the operating room previously for debridement.  This is
 a second look operation.

 

PROCEDURE:  Following induction of adequate anesthesia, the patient was prepped and draped in usual s
terile fashion in supine position.  The lateral skin of lateral thigh that appeared nonviable was sha
rply excised back to viable edges.  A large seroma cavity was encountered emerging from the posterior
 part of his thigh.  Section revealed that this is essentially a degloving injury of the entire poste
rior extent of his thigh.  Also, the vastus lateralis was noted to be nonviable.  This was sharply ex
cised with scissors.  All the other components of the quadriceps were tested with electrical stimulat
ion noted to contract normally.  Penrose drains were placed in the wound extending posteriorly so xavier
t they would be in continuity with the eventual wound VAC as well as more posterior seroma.

 

The plan will be to let the degloved cavity collapse and heel down.  Eventually, the wound will need 
skin grafting.

## 2018-09-20 NOTE — PRG-2
DATE OF SERVICE:  09/19/2018

 

SUBJECTIVE:  This is a 53-year-old male status post work injury, left leg 
laceration, hospital day #6, postop day #5 for washout of wound and wound VAC 
placement.  The patient states that his pain is being controlled right now.  
Patient did not participate in physical therapy today due to surgery planned 
for later today.  The patient denies any shortness of breath, chest pains, 
abdominal pain, nausea, or vomiting.  Pt. states their felt like leg was more 
swollen today.  Patient thought same, however, upon observation leg looks 
similar to the initial presentation.

 

OBJECTIVE:

VITAL SIGNS:  Temperature 98.0, pulse 84, respirations 18, pulse ox 95 on room 
air, blood pressure 144/75.

GENERAL:  The patient is sitting in a chair, watching TV, in no acute distress.
  Wound VAC is in place.

LUNGS:  Clear to auscultation with nonlabored breathing.

CARDIOVASCULAR:  Regular rate and rhythm with no murmurs.

ABDOMEN:  Soft, nontender with positive bowel sounds.

EXTREMITIES:  Neurovascularly intact x4.

 

LABORATORY DATA:  None to review today.

 

RADIOGRAPHIC IMAGING:  None to review.

 

ASSESSMENT AND PLAN:

1.  Status post large soft tissue injury.

2.  Status post irrigation and debridement of the wound with wound VAC placement
, postop day #5.

3.  Deep venous thrombosis diagnosed with venous Doppler yesterday.

4.  COPD 1, status post IVC filter.

5.  Acute pain secondary to above.

 

PLAN:  Cardiovascular Surgery was consulted yesterday for placement of IVC 
filter.  This was secondary to deep venous thrombosis present in left leg.  The 
patient is not a candidate for anticoagulation therapy at this time due to 
future plans of skin graft to affected wound.  The patient is going tonight for 
Surgery for further debridement of wound as there is dead skin and necrotic 
tissue in the surrounding area.  Patient is on GI prophylaxis as well as SCDs 
for DVT prophylaxis as well as IVC filter for thromboembolism prophylaxis.  He 
will continue providing supportive measures and pain management.  We will 
continue having physical therapy and occupational therapy work with and treat 
the patient.

 

Dr. Sutton saw this patient.  We discussed the treatment plan.

 

LOGAN

## 2018-09-20 NOTE — PRG-2
DATE OF SERVICE:  09/20/2018

 

SUBJECTIVE:  This is a 53-year-old male status post work injury, left leg laceration, hospital day 7,
 postop day 6 for washout of wound and wound VAC placement, postop day 2 for IVC filter placement, po
stop day 1 debridement of wound and nonviable vastus lateralis.  The patient states that his pain has
 been controlled right now and has been able to work some with physical therapy.  The patient denies 
any shortness of breath, chest pains, abdominal pain, nausea or vomiting.

 

OBJECTIVE:

VITAL SIGNS:  Temperature 98.0, heart rate 72, respirations 20, pulse ox 95% on room air, blood press
ure 149/85.

GENERAL:  The patient is sitting in a chair, in no acute distress.  Wound VAC is in place.

LUNGS:  Clear to auscultation, nonlabored breathing.

CARDIOVASCULAR:  Regular rate and rhythm.  No murmurs.

ABDOMEN:  Soft, nontender.  Bowel sounds present.

EXTREMITIES:  Neurovascularly intact x4.

 

LABORATORY DATA:  Hemoglobin 10.3, hematocrit 32.5, platelet count 386,000.  APTT 32.7.

 

ASSESSMENT:

1.  Status post large soft tissue injury.

2.  Status post irrigation and debridement of wound, postoperative day number 6.

3.  Deep vein thrombosis, status post IVC filter, postoperative day number 2.

4.  Acute pain secondary to above.

 

PLAN:  The patient was taken back to surgery last night for debridement of wound.  The patient had no
nviable skin of the left thigh as well as nonviable vastus lateralis removed.  The patient will have 
skin graft performed in the future; however, in the interim, the patient has been placed on heparin f
or anticoagulation.  The patient is also on GI prophylaxis.  We will continue providing supportive me
asures and pain management.  Today, we included gabapentin in his pain management.  We will continue 
having physical therapy and occupational therapy work with the patient.

 

Dr. Sutton saw this patient and we discussed their treatment and plan.

## 2018-09-20 NOTE — PDOC.CTH
Cardiology Progress Note





- Subjective





He had to go back to the OR yesterday for debridement due to a large amount of 

necrotic tissue from his wound. 





- Objective


 Vital Signs











  Temp Pulse Resp BP BP Pulse Ox


 


 09/20/18 15:20  98.4 F  76  20   157/79 H  96


 


 09/20/18 11:15  98.0 F  72  20   149/85 H  95


 


 09/20/18 09:05  97.9 F  91  18   130/76  94 L


 


 09/20/18 08:39   82   135/81  


 


 09/20/18 08:38   82   135/81   94 L








 











Weight                         273 lb 14.4 oz














 











 09/19/18 09/20/18 09/21/18





 06:59 06:59 06:59


 


Intake Total 1400 1680 800


 


Output Total  800 


 


Balance 1400 880 800














- Physical Examination


General/Neuro: alert & oriented x3, NAD


Neck: no JVD present


Lungs: CTA, unlabored respirations


Heart: RRR


Abdomen: NT/ND


Extremities: + edema B (2+ left)





- Labs


Result Diagrams: 


 09/20/18 15:12





 09/17/18 05:21


 Troponin/CKMB











CK-MB (CK-2)  6.3 ng/mL (0-6.6)   09/13/18  07:11    


 


Troponin I  0.103 ng/mL (< 0.028)  H  09/13/18  07:11    














- Assessment/Plan





1. HTN, poorly controlled


2. LVH, possible infiltrative disease.


3. S/P crush injury to left leg. 


4. Left leg DVT


5. Hematuria








PLAN:


- Will need better BP control.


- Will increase his Nifedipine.


- Continue other meds. 


- Pain control..


- Blood work tomorrow morning.

## 2018-09-21 LAB
ALBUMIN SERPL BCG-MCNC: 3.3 G/DL (ref 3.5–5)
ALP SERPL-CCNC: 71 U/L (ref 40–150)
ALT SERPL W P-5'-P-CCNC: 99 U/L (ref 8–55)
ANION GAP SERPL CALC-SCNC: 15 MMOL/L (ref 10–20)
AST SERPL-CCNC: 71 U/L (ref 5–34)
BILIRUB SERPL-MCNC: 0.6 MG/DL (ref 0.2–1.2)
BUN SERPL-MCNC: 20 MG/DL (ref 8.4–25.7)
CALCIUM SERPL-MCNC: 9 MG/DL (ref 7.8–10.44)
CHLORIDE SERPL-SCNC: 99 MMOL/L (ref 98–107)
CO2 SERPL-SCNC: 28 MMOL/L (ref 22–29)
CREAT CL PREDICTED SERPL C-G-VRATE: 101 ML/MIN (ref 70–130)
GLOBULIN SER CALC-MCNC: 3.3 G/DL (ref 2.4–3.5)
GLUCOSE SERPL-MCNC: 106 MG/DL (ref 70–105)
HGB BLD-MCNC: 10.3 G/DL (ref 14–18)
MCH RBC QN AUTO: 27.4 PG (ref 27–31)
MCV RBC AUTO: 87.7 FL (ref 78–98)
MDIFF COMPLETE?: YES
PLATELET # BLD AUTO: 459 THOU/UL (ref 130–400)
PLATELET BLD QL SMEAR: (no result)
POTASSIUM SERPL-SCNC: 4.2 MMOL/L (ref 3.5–5.1)
RBC # BLD AUTO: 3.76 MILL/UL (ref 4.7–6.1)
SODIUM SERPL-SCNC: 138 MMOL/L (ref 136–145)
WBC # BLD AUTO: 15.4 THOU/UL (ref 4.8–10.8)

## 2018-09-21 RX ADMIN — DOCUSATE SODIUM 50 MG AND SENNOSIDES 8.6 MG SCH: 8.6; 5 TABLET, FILM COATED ORAL at 21:32

## 2018-09-21 RX ADMIN — DOCUSATE SODIUM 50 MG AND SENNOSIDES 8.6 MG SCH TAB: 8.6; 5 TABLET, FILM COATED ORAL at 08:20

## 2018-09-21 RX ADMIN — NIFEDIPINE SCH MG: 60 TABLET, EXTENDED RELEASE ORAL at 08:20

## 2018-09-21 NOTE — PRG
DATE OF SERVICE:  09/21/2018

 

SUBJECTIVE:  The patient remains on the surgical floor.  He is status post large soft tissue defect f
rom an industrial accident 9 days ago.  The patient has been doing well overnight.  He underwent a re
vision of his irrigation, debridement, which he tolerated this procedure well, though he states that 
he is having more pain with therapy than previously, but it is manageable.  He is otherwise toleratin
g a diet and his bowel function has returned.

 

PHYSICAL EXAMINATION:

VITAL SIGNS:  Temperature is 98.5, heart rate 82, blood pressure 136/75, respirations 20, oxygen satu
ration is 95% on room air.

GENERAL:  The patient is resting comfortably in a bedside chair.  He is awake, alert, and oriented x3
.  [place] coma scale is 15.

HEENT:  Unremarkable.

LUNGS:  Clear to auscultation with good inspiratory and expiratory effort.

HEART:  Regular rate and rhythm.

ABDOMEN:  Soft, flat, nontender with active bowel sounds.

EXTREMITIES:  Neurovascularly intact x4.  Postop dressing is clean, dry, and intact.

 

LABORATORY DATA:  White blood cell count 15.4, hemoglobin 10.3, hematocrit 33.0, platelets 459.  Sodi
um 138, potassium 4.2, chloride 99, CO2 of 28, BUN 20, creatinine is 1.51, glucose 106.  There are no
 radiographs to review this morning.

 

ASSESSMENT AND PLAN:

1.  Status post industrial accident.

2.  Large soft tissue defect. 

 

Plan will be to continue supportive care.  The patient will be evaluated again by Dr. Enriquez to make a 
decision on final grafting which may actually occur tomorrow.

## 2018-09-21 NOTE — PDOC.CTH
Cardiology Progress Note





- Subjective





No new issues. BP well controlled now. heparin started without bleeding issues 

so far.





- Objective


 Vital Signs











  Temp Pulse Resp BP Pulse Ox


 


 09/21/18 11:40  98.2 F  68  12  134/75  94 L


 


 09/21/18 08:20   82   


 


 09/21/18 08:00      95


 


 09/21/18 07:46  98.5 F  82  20  136/75  95


 


 09/21/18 07:43  98.7 F  90  12  137/75  92 L


 


 09/21/18 03:49  98.5 F  68  20  131/74  95








 











Weight                         273 lb 14.4 oz














 











 09/20/18 09/21/18 09/22/18





 06:59 06:59 06:59


 


Intake Total 1680 1200 


 


Output Total 800  


 


Balance 880 1200 














- Physical Examination


General/Neuro: alert & oriented x3, NAD


Neck: no JVD present


Lungs: CTA, unlabored respirations


Heart: RRR


Abdomen: NT/ND


Extremities: + edema B (1+)





- Labs


Result Diagrams: 


 09/21/18 04:48





 09/21/18 04:48


 Troponin/CKMB











CK-MB (CK-2)  6.3 ng/mL (0-6.6)   09/13/18  07:11    


 


Troponin I  0.103 ng/mL (< 0.028)  H  09/13/18  07:11    














- Assessment/Plan





1. HTN, poorly controlled


2. LVH, possible infiltrative disease.


3. S/P crush injury to left leg. 


4. Left leg DVT


5. Hematuria








PLAN:


- Continue current BP meds. 


- IV fluids, will hold HCTZ for 2 days. 


- Wound care per trauma team.

## 2018-09-22 LAB
HGB BLD-MCNC: 11.5 G/DL (ref 14–18)
PLATELET # BLD AUTO: 556 THOU/UL (ref 130–400)

## 2018-09-22 RX ADMIN — DOCUSATE SODIUM 50 MG AND SENNOSIDES 8.6 MG SCH: 8.6; 5 TABLET, FILM COATED ORAL at 22:14

## 2018-09-22 RX ADMIN — NIFEDIPINE SCH MG: 60 TABLET, EXTENDED RELEASE ORAL at 09:35

## 2018-09-22 RX ADMIN — DOCUSATE SODIUM 50 MG AND SENNOSIDES 8.6 MG SCH: 8.6; 5 TABLET, FILM COATED ORAL at 09:34

## 2018-09-22 NOTE — PRG
DATE OF SERVICE:  09/22/2018

 

SUBJECTIVE:  This is a 53-year-old male status post significant soft tissue injury after an [employer] accident approximately 10 days ago.  The patient had no acute overnight events.  He remains on a he
candi drip for deep venous thrombosis.  Pain is well controlled and he is working with physical thera
py.  He is tolerating a general diet.

 

OBJECTIVE:

VITAL SIGNS:  Temperature 98.4, pulse 79, respirations 18, O2 sat 95% on room air, blood pressure 170
/91.

GENERAL:  Well-developed male, in no acute distress, resting in bed.

PULMONARY:  Normal work of breathing.  Symmetric rise.

CARDIOVASCULAR:  Regular rate and rhythm.

GASTROINTESTINAL:  Abdomen is soft, nontender, nondistended.

MUSCULOSKELETAL:  Moves all extremities x4.  Left lower extremity wound VAC is in place.  No evidence
 of previously mentioned seroma.  There is 1+ pitting edema on the left lower extremity.

 

LABORATORY FINDINGS:  Hemoglobin 11.5, hematocrit 37.2, PTT 80.3.

 

ASSESSMENT:

1.  Status post crush injury.

2.  Large thigh soft tissue injury status post irrigation and debridement with wound VAC placement.

3.  Acute traumatic pain.

4.  Left popliteal deep venous thrombosis.

5.  Hypertension, present on admission, poorly controlled.

6.  Chronic kidney disease, present on admission.

 

PLAN:  I have discussed the case with Dr. Enriquez who plans to return the patient to the operating room 
tomorrow for possible graft placement.  The patient will be n.p.o. after midnight.  Continue heparin 
drip for now and discontinue per Dr. Enriquez's recommendations.  A.m. labs.  Antihypertensives per [facility]
ology.  The patient was discussed with Trauma attending.  Plan of care were discussed with the patien
t at bedside and all questions were answered at the time of this dictation.

## 2018-09-22 NOTE — EKG
Test Reason : 

Blood Pressure : ***/*** mmHG

Vent. Rate : 071 BPM     Atrial Rate : 071 BPM

   P-R Int : 156 ms          QRS Dur : 084 ms

    QT Int : 478 ms       P-R-T Axes : 027 016 197 degrees

   QTc Int : 519 ms

 

Normal sinus rhythm

Possible Left atrial enlargement

Left ventricular hypertrophy



Prolonged QT

Abnormal ECG

 

Confirmed by SHIRA BARNARD ([id number]),  ANDRA WYNN ([id number]) on 9/22/2018 12:17:55 PM

 

Referred By:  EVON           Confirmed By:SHIRA BARNARD

## 2018-09-23 PROCEDURE — 0HBHXZZ EXCISION OF RIGHT UPPER LEG SKIN, EXTERNAL APPROACH: ICD-10-PCS | Performed by: PLASTIC SURGERY

## 2018-09-23 PROCEDURE — 0HRJX74 REPLACEMENT OF LEFT UPPER LEG SKIN WITH AUTOLOGOUS TISSUE SUBSTITUTE, PARTIAL THICKNESS, EXTERNAL APPROACH: ICD-10-PCS | Performed by: PLASTIC SURGERY

## 2018-09-23 RX ADMIN — Medication SCH GM: at 18:37

## 2018-09-23 RX ADMIN — DOCUSATE SODIUM 50 MG AND SENNOSIDES 8.6 MG SCH: 8.6; 5 TABLET, FILM COATED ORAL at 21:45

## 2018-09-23 RX ADMIN — DOCUSATE SODIUM 50 MG AND SENNOSIDES 8.6 MG SCH TAB: 8.6; 5 TABLET, FILM COATED ORAL at 08:39

## 2018-09-23 RX ADMIN — NIFEDIPINE SCH MG: 60 TABLET, EXTENDED RELEASE ORAL at 08:39

## 2018-09-23 NOTE — OP
PREOPERATIVE DIAGNOSIS:  Open wound, left thigh.

 

POSTOPERATIVE DIAGNOSIS:  Open wound, left thigh, with debris.

 

PROCEDURES:

1.  Left thigh in preparation for skin grafting (420 sq cm) (06530, 15003 x3).

2.  Split thickness skin graft, left thigh (420 sq cm) (58621, 15101 x3).

3.  Application of wound VAC (420 sq cm).

 

DESCRIPTION OF PROCEDURE:  Following the induction of adequate anesthesia, the 
patient was prepped and draped in the usual sterile fashion in the supine 
position.  The patient's wounds were explored.  The patient had a posterior 
seroma.  A stab incision was made posteriorly and a drain was threaded out the 
back exiting posteriorly occupying the lateral and posterior cavity of the 
seroma.  There is no appreciable fluid in there today.

 

The left anterior and medial thigh wound was then sharply debrided back to 
punctate edges or punctate bleeding and then copiously irrigated.

 

Split-thickness skin grafts were harvested from the right anterior and lateral 
thigh and meshed at 1.5 to 1.  The skin grafts were secured with 4-0 chromic 
suture and staples.

 

To ensure or improve survival of the wound graft as well as dressing the wound 
VAC was then placed in the usual fashion.  The patient tolerated the procedure 
well.

 

LOGAN

## 2018-09-23 NOTE — PRG
DATE OF SERVICE:  09/23/2018

 

SUBJECTIVE:  This is a 53-year-old male who is status post industrial accident with large soft tissue
 injury to his left thigh.  The patient is being seen and evaluated in the postanesthesia care unit. 
 He has had application of a skin graft with Dr. Enriquez earlier today.  Upon my evaluation, the patient
 was resting comfortably and vocalized no complaint.  A wound VAC was in place.

 

OBJECTIVE:

VITAL SIGNS:  Blood pressure 118/58, O2 sat 97%, heart rate of 92.

GENERAL:  Resting in bed, in no acute distress.

PULMONARY:  Normal work of breathing.  Symmetric rise.

CARDIOVASCULAR:  Regular rate and rhythm.

GASTROINTESTINAL:  Abdomen is soft, nontender, nondistended.

MUSCULOSKELETAL:  Moves all extremities x4.  Wound VAC is in place.

NEUROLOGIC:  No focal deficit noted.

 

PLAN:  Continue perioperative PT and OT.  Continue to encourage mobility.  Per discussion with Dr. Mitchell miles, patient may resume heparin drip tomorrow afternoon.  He plans for takedown of the wound VAC Wedne
sday, after which the patient may be discharged home if he is medically stable.  The patient will nee
d transition from IV anticoagulant to oral agent.  We will defer decision until discussed with the pa
tient and family risks versus benefits of Coumadin versus NOAC.  Patient will need eventual removal o
f IVC filter.  Patient has been discussed with trauma attending.

## 2018-09-24 LAB
ANION GAP SERPL CALC-SCNC: 15 MMOL/L (ref 10–20)
BASOPHILS # BLD AUTO: 0 THOU/UL (ref 0–0.2)
BASOPHILS NFR BLD AUTO: 0.1 % (ref 0–1)
BUN SERPL-MCNC: 26 MG/DL (ref 8.4–25.7)
CALCIUM SERPL-MCNC: 9 MG/DL (ref 7.8–10.44)
CHLORIDE SERPL-SCNC: 99 MMOL/L (ref 98–107)
CO2 SERPL-SCNC: 30 MMOL/L (ref 22–29)
CREAT CL PREDICTED SERPL C-G-VRATE: 85 ML/MIN (ref 70–130)
EOSINOPHIL # BLD AUTO: 0.1 THOU/UL (ref 0–0.7)
EOSINOPHIL NFR BLD AUTO: 0.8 % (ref 0–10)
GLUCOSE SERPL-MCNC: 127 MG/DL (ref 70–105)
HGB BLD-MCNC: 8.9 G/DL (ref 14–18)
HGB BLD-MCNC: 9.7 G/DL (ref 14–18)
HGB BLD-MCNC: 9.7 G/DL (ref 14–18)
LYMPHOCYTES # BLD: 1.3 THOU/UL (ref 1.2–3.4)
LYMPHOCYTES NFR BLD AUTO: 7.8 % (ref 21–51)
MAGNESIUM SERPL-MCNC: 1.9 MG/DL (ref 1.6–2.6)
MCH RBC QN AUTO: 27.7 PG (ref 27–31)
MCV RBC AUTO: 87 FL (ref 78–98)
MONOCYTES # BLD AUTO: 1.7 THOU/UL (ref 0.11–0.59)
MONOCYTES NFR BLD AUTO: 10.1 % (ref 0–10)
NEUTROPHILS # BLD AUTO: 13.8 THOU/UL (ref 1.4–6.5)
NEUTROPHILS NFR BLD AUTO: 81.2 % (ref 42–75)
PLATELET # BLD AUTO: 543 THOU/UL (ref 130–400)
PLATELET # BLD AUTO: 544 THOU/UL (ref 130–400)
PLATELET # BLD AUTO: 577 THOU/UL (ref 130–400)
POTASSIUM SERPL-SCNC: 4.1 MMOL/L (ref 3.5–5.1)
RBC # BLD AUTO: 3.2 MILL/UL (ref 4.7–6.1)
SODIUM SERPL-SCNC: 140 MMOL/L (ref 136–145)
WBC # BLD AUTO: 17 THOU/UL (ref 4.8–10.8)

## 2018-09-24 RX ADMIN — Medication SCH GM: at 01:38

## 2018-09-24 RX ADMIN — DOCUSATE SODIUM 50 MG AND SENNOSIDES 8.6 MG SCH TAB: 8.6; 5 TABLET, FILM COATED ORAL at 08:47

## 2018-09-24 RX ADMIN — DOCUSATE SODIUM 50 MG AND SENNOSIDES 8.6 MG SCH TAB: 8.6; 5 TABLET, FILM COATED ORAL at 21:50

## 2018-09-24 RX ADMIN — NIFEDIPINE SCH MG: 60 TABLET, EXTENDED RELEASE ORAL at 08:47

## 2018-09-24 NOTE — PRG-2
DATE OF SERVICE:  09/24/2018

 

SUBJECTIVE:  This is a 53-year-old  male who is status post an 
industrial accident with a large soft tissue injury to his left thigh.  The 
patient is postop day #1 status post application of skin graft by Dr. Enriquez 
earlier yesterday. On evaluation, the patient was resting comfortably out of 
bed in bedside chair with no complaints.  A wound VAC was in place over his 
left thigh.

 

PHYSICAL EXAMINATION:

VITAL SIGNS:  Temperature 98.6 degrees Fahrenheit, pulse 97, respirations 16, 
O2 sat 94% on room air, blood pressure 126/65.

GENERAL:  The patient sitting up in bedside chair, in no acute distress.

PULMONARY:  Normal work of breathing.  Symmetric rise.

CARDIOVASCULAR:  Regular rate and rhythm.

GASTROINTESTINAL:  Abdomen is soft, nontender, nondistended.

MUSCULOSKELETAL:  Full range of motion in all extremities.  Wound VAC in place 
over left thigh.  Graft sites over right thigh covered with a protective 
dressing with no signs of surrounding erythema and no active bleeding noted.

NEUROLOGIC:  No focal deficits noted.

 

LABORATORY DATA:  White blood count 17, hemoglobin 8.9, hematocrit 27.8, 
platelet count 544.  APTT 33.2.  Sodium 140, potassium 4.1, chloride 99, carbon 
dioxide 30, BUN 26, creatinine 1.78, estimated GFR 49, glucose 127.  

 

PLAN:  We will continue perioperative PT and OT.  We will continue to encourage 
mobility/ambulation.  Per Dr. Enriquez, the patient may resume heparin drip later 
today.  He plans for a takedown of the wound VAC on Wednesday and states that 
afterwards the patient may be discharged home so long as he is medically 
stable.  The patient will need to transition from an IV anticoagulant to an 
oral agent.  Discussed risks versus benefits of Coumadin versus NOACs with 
patient today.  Instructed him to discuss these options with family before 
making a final decision regarding which agent to take upon discharge.  Of note, 
patient will need eventual removal of his IVC filter.   We will give a 1 liter 
bolus of LR at 100 mL an hour due to the patient's residual acute kidney injury 
secondary to rhabdomyolysis.  We will continue to follow. 

 

The patient has been discussed with trauma attending and Physician Assistant.

 

LOGAN

## 2018-09-24 NOTE — PDOC.CTH
Cardiology Progress Note





- Subjective





He had his skin graft done over the weekend. heparin has been restarted without 

issues. BP well controlled. 





- Objective


 Vital Signs











  Temp Pulse Resp BP Pulse Ox


 


 09/24/18 15:33  98.9 F  73  18  133/80  96


 


 09/24/18 11:58  98.3 F  65  16  129/74  93 L


 


 09/24/18 08:01  98.6 F  97  16  126/65  94 L








 











Admit Weight                   273 lb 14.4 oz


 


Weight                         273 lb 14.4 oz














 











 09/23/18 09/24/18 09/25/18





 06:59 06:59 06:59


 


Intake Total 3690  2340


 


Balance 3690  2340














- Physical Examination


General/Neuro: alert & oriented x3, NAD


Neck: no JVD present


Lungs: CTA, unlabored respirations


Heart: RRR


Abdomen: NT/ND


Extremities: + edema B (1+)





- Labs


Result Diagrams: 


 09/24/18 15:15





 09/24/18 03:35


 Troponin/CKMB











CK-MB (CK-2)  6.3 ng/mL (0-6.6)   09/13/18  07:11    


 


Troponin I  0.103 ng/mL (< 0.028)  H  09/13/18  07:11    














- Assessment/Plan





1. HTN, better controlled. 


2. LVH, possible infiltrative disease.


3. S/P crush injury to left leg. 


4. Left leg DVT


5. DONNA on CKD.








PLAN:


- Continue current BP meds. 


- Will hold HCTZ for good as creatinine continues to rise. 


- Wound care per trauma team. 


- On discharge patient will need to be on Eliquis 10 mg BID for 7 days then 5 

mg BID for a total of 3 to 6 months.


- Continue other BP meds.


- Avoid neprhotoxic agents.


- Will stop Ibuprofen due to DONNA.

## 2018-09-25 LAB
BASOPHILS # BLD AUTO: 0.1 THOU/UL (ref 0–0.2)
BASOPHILS NFR BLD AUTO: 0.7 % (ref 0–1)
EOSINOPHIL # BLD AUTO: 1 THOU/UL (ref 0–0.7)
EOSINOPHIL NFR BLD AUTO: 5.7 % (ref 0–10)
HGB BLD-MCNC: 9.6 G/DL (ref 14–18)
LYMPHOCYTES # BLD: 3.2 THOU/UL (ref 1.2–3.4)
LYMPHOCYTES NFR BLD AUTO: 18.1 % (ref 21–51)
MCH RBC QN AUTO: 26.7 PG (ref 27–31)
MCV RBC AUTO: 87.7 FL (ref 78–98)
MONOCYTES # BLD AUTO: 1.7 THOU/UL (ref 0.11–0.59)
MONOCYTES NFR BLD AUTO: 9.8 % (ref 0–10)
NEUTROPHILS # BLD AUTO: 11.4 THOU/UL (ref 1.4–6.5)
NEUTROPHILS NFR BLD AUTO: 65.6 % (ref 42–75)
PLATELET # BLD AUTO: 603 THOU/UL (ref 130–400)
RBC # BLD AUTO: 3.57 MILL/UL (ref 4.7–6.1)
WBC # BLD AUTO: 17.4 THOU/UL (ref 4.8–10.8)

## 2018-09-25 RX ADMIN — Medication PRN ML: at 02:27

## 2018-09-25 RX ADMIN — DOCUSATE SODIUM 50 MG AND SENNOSIDES 8.6 MG SCH: 8.6; 5 TABLET, FILM COATED ORAL at 09:10

## 2018-09-25 RX ADMIN — DOCUSATE SODIUM 50 MG AND SENNOSIDES 8.6 MG SCH TAB: 8.6; 5 TABLET, FILM COATED ORAL at 20:50

## 2018-09-25 RX ADMIN — NIFEDIPINE SCH MG: 60 TABLET, EXTENDED RELEASE ORAL at 09:09

## 2018-09-25 NOTE — PRG
DATE OF SERVICE:  09/25/2018

 

SUBJECTIVE:  The patient voices no complaints this morning.  Drains were in 
place.

 

OBJECTIVE:

VITAL SIGNS:  Stable.

BACK:  Dressing is in place.  Posterior drain is working.

 

ASSESSMENT:  Status post skin grafting.

 

PLAN:  To removeVAc

 

MTDD

## 2018-09-25 NOTE — PRG-2
DATE OF SERVICE:  09/25/2018

 

SUBJECTIVE:  This is a 53-year-old  gentleman who is status 
post an industrial accident with a large soft tissue injury to his left thigh.  
The patient is postop day #2 status post application of skin graft by Dr. Enriquez.
  On evaluation, the patient was resting comfortably in bed with no complaints.
  States pain is well controlled on p.o. pain medications.

 

PHYSICAL EXAMINATION:

GENERAL:  The patient is lying in bed, in no acute distress.

VITAL SIGNS:  Temperature 98.1 degrees Fahrenheit, pulse 71, respirations 22, 
O2 sat 91% on room air, blood pressure 171/91.

PULMONARY:  Normal work of breathing.  Symmetric chest rise.

CARDIOVASCULAR:  Regular rate and rhythm, no murmurs.

GASTROINTESTINAL:  Abdomen is distended secondary to body habitus, but 
nontender to palpation.

MUSCULOSKELETAL:  The patient has full range of motion in all extremities.  
Wound VAC is in place over the left thigh.  Graft sites over right thigh are 
covered with clear protective coverings with no signs of surrounding edema and 
no active bleeding noted.

NEUROLOGIC:  No focal deficits noted.

 

LABORATORY FINDINGS:  White blood count 17.4, hemoglobin 9.6, hematocrit 31.3, 
platelets 603.  .4.

 

RADIOLOGIC FINDINGS:  No new radiographic findings to review.

 

ASSESSMENT:

1.  Status post crush injury.

2.  Large thigh soft tissue injury status post irrigation and debridement and 
skin graft placement with wound VAC in place.

3.  Acute traumatic pain.

4.  Left popliteal deep vein thrombosis s/p IVC filter placement.

5.  Hypertension, poorly controlled.

6.  Chronic kidney disease, present on admission.

 

PLAN:  We will discontinue heparin drip today with plan to start on no active 
therapy with Eliquis 10 mg b.i.d. for 7 days and then decreasing to 5 mg b.i.d. 
for 3-6 months per recommendations of Cardiology.  We will continue 
postoperative PT and OT.  We will continue to encourage mobility and ambulation 
as well.  Dr. Enriquez plans to takedown wound VAC tomorrow.  We will continue to 
follow the patient's renal function status and consider resuming IV fluid 
support if BUN to creatinine ratio increases to greater than 20.  We will hold 
off resuming intravenous fluids for now and encourage adequate p.o. intake.  We 
will consider adjusting antihypertensive regimen if blood pressure remains 
persistently elevated.  The patient has been discussed with trauma attending.

 

LOGAN

## 2018-09-26 LAB
ANION GAP SERPL CALC-SCNC: 10 MMOL/L (ref 10–20)
BASOPHILS # BLD AUTO: 0.1 THOU/UL (ref 0–0.2)
BASOPHILS NFR BLD AUTO: 0.4 % (ref 0–1)
BUN SERPL-MCNC: 16 MG/DL (ref 8.4–25.7)
CALCIUM SERPL-MCNC: 8.9 MG/DL (ref 7.8–10.44)
CHLORIDE SERPL-SCNC: 101 MMOL/L (ref 98–107)
CO2 SERPL-SCNC: 29 MMOL/L (ref 22–29)
CREAT CL PREDICTED SERPL C-G-VRATE: 124 ML/MIN (ref 70–130)
EOSINOPHIL # BLD AUTO: 0.4 THOU/UL (ref 0–0.7)
EOSINOPHIL NFR BLD AUTO: 2.3 % (ref 0–10)
GLUCOSE SERPL-MCNC: 190 MG/DL (ref 70–105)
HGB BLD-MCNC: 9.5 G/DL (ref 14–18)
HGB BLD-MCNC: 9.5 G/DL (ref 14–18)
HGB BLD-MCNC: 9.8 G/DL (ref 14–18)
LYMPHOCYTES # BLD: 2.1 THOU/UL (ref 1.2–3.4)
LYMPHOCYTES NFR BLD AUTO: 13.2 % (ref 21–51)
MAGNESIUM SERPL-MCNC: 1.7 MG/DL (ref 1.6–2.6)
MCH RBC QN AUTO: 27.1 PG (ref 27–31)
MCV RBC AUTO: 87.2 FL (ref 78–98)
MONOCYTES # BLD AUTO: 1.2 THOU/UL (ref 0.11–0.59)
MONOCYTES NFR BLD AUTO: 7.1 % (ref 0–10)
NEUTROPHILS # BLD AUTO: 12.5 THOU/UL (ref 1.4–6.5)
NEUTROPHILS NFR BLD AUTO: 77 % (ref 42–75)
PLATELET # BLD AUTO: 596 THOU/UL (ref 130–400)
PLATELET # BLD AUTO: 606 THOU/UL (ref 130–400)
PLATELET # BLD AUTO: 636 THOU/UL (ref 130–400)
POTASSIUM SERPL-SCNC: 3.4 MMOL/L (ref 3.5–5.1)
RBC # BLD AUTO: 3.52 MILL/UL (ref 4.7–6.1)
SODIUM SERPL-SCNC: 137 MMOL/L (ref 136–145)
WBC # BLD AUTO: 16.2 THOU/UL (ref 4.8–10.8)

## 2018-09-26 RX ADMIN — POTASSIUM CHLORIDE SCH MLS: 14.9 INJECTION, SOLUTION INTRAVENOUS at 13:00

## 2018-09-26 RX ADMIN — DOCUSATE SODIUM 50 MG AND SENNOSIDES 8.6 MG SCH: 8.6; 5 TABLET, FILM COATED ORAL at 09:35

## 2018-09-26 RX ADMIN — DOCUSATE SODIUM 50 MG AND SENNOSIDES 8.6 MG SCH TAB: 8.6; 5 TABLET, FILM COATED ORAL at 20:37

## 2018-09-26 RX ADMIN — POTASSIUM CHLORIDE SCH MLS: 14.9 INJECTION, SOLUTION INTRAVENOUS at 09:43

## 2018-09-26 RX ADMIN — NIFEDIPINE SCH MG: 60 TABLET, EXTENDED RELEASE ORAL at 09:34

## 2018-09-27 LAB
ANION GAP SERPL CALC-SCNC: 11 MMOL/L (ref 10–20)
BASOPHILS # BLD AUTO: 0 THOU/UL (ref 0–0.2)
BASOPHILS NFR BLD AUTO: 0.2 % (ref 0–1)
BUN SERPL-MCNC: 15 MG/DL (ref 8.4–25.7)
CALCIUM SERPL-MCNC: 9 MG/DL (ref 7.8–10.44)
CHLORIDE SERPL-SCNC: 100 MMOL/L (ref 98–107)
CO2 SERPL-SCNC: 31 MMOL/L (ref 22–29)
CREAT CL PREDICTED SERPL C-G-VRATE: 152 ML/MIN (ref 70–130)
EOSINOPHIL # BLD AUTO: 0.4 THOU/UL (ref 0–0.7)
EOSINOPHIL NFR BLD AUTO: 2.5 % (ref 0–10)
GLUCOSE SERPL-MCNC: 94 MG/DL (ref 70–105)
HGB BLD-MCNC: 9.2 G/DL (ref 14–18)
LYMPHOCYTES # BLD: 2 THOU/UL (ref 1.2–3.4)
LYMPHOCYTES NFR BLD AUTO: 12.6 % (ref 21–51)
MAGNESIUM SERPL-MCNC: 1.7 MG/DL (ref 1.6–2.6)
MCH RBC QN AUTO: 27 PG (ref 27–31)
MCV RBC AUTO: 87.6 FL (ref 78–98)
MONOCYTES # BLD AUTO: 1.5 THOU/UL (ref 0.11–0.59)
MONOCYTES NFR BLD AUTO: 9.2 % (ref 0–10)
NEUTROPHILS # BLD AUTO: 12 THOU/UL (ref 1.4–6.5)
NEUTROPHILS NFR BLD AUTO: 75.6 % (ref 42–75)
PLATELET # BLD AUTO: 607 THOU/UL (ref 130–400)
POTASSIUM SERPL-SCNC: 4.4 MMOL/L (ref 3.5–5.1)
RBC # BLD AUTO: 3.42 MILL/UL (ref 4.7–6.1)
SODIUM SERPL-SCNC: 138 MMOL/L (ref 136–145)
WBC # BLD AUTO: 15.9 THOU/UL (ref 4.8–10.8)

## 2018-09-27 RX ADMIN — DOCUSATE SODIUM 50 MG AND SENNOSIDES 8.6 MG SCH TAB: 8.6; 5 TABLET, FILM COATED ORAL at 08:05

## 2018-09-27 RX ADMIN — NIFEDIPINE SCH MG: 60 TABLET, EXTENDED RELEASE ORAL at 08:04

## 2018-09-27 RX ADMIN — DOCUSATE SODIUM 50 MG AND SENNOSIDES 8.6 MG SCH TAB: 8.6; 5 TABLET, FILM COATED ORAL at 20:00

## 2018-09-27 NOTE — PRG
DATE OF SERVICE:  09/27/2018

 

The patient was seen by Dr. Jesus Sutton.

 

SUBJECTIVE:  Mr. Bearden is progressing well.  He is postop day #2 from a 
skin graft and Wound Care is following.  Patient has minimal complaints.  
Social work and case management working on placement for a bedside commode and 
home health wound care at this time.  Patient's hemodynamics have remained 
stable.  His white blood cell count is down trending.  He did have a 
leukocytosis.  Hemoglobin is stable at 9.2 today.  Patient reports that his 
pain is under control.  Creatinine continues to improve and is at 1.0 this 
morning.  Orthopedics has cleared the patient for discharge home with followup 
as well as Dr. Enriquez.  We will wait for his equipment to get there and the home 
health consult to be placed.

 

OBJECTIVE:

VITAL SIGNS:  Blood pressure 135/65, heart rate is 83, temperature is 98.5.  He 
is breathing 18 times a minute, 98% on room air.

GENERAL:  A 53-year-old male sitting in bed in no acute distress.

HEENT:  Normocephalic, atraumatic.

NECK:  Trachea is midline.

LUNGS:  No respiratory distress.

CARDIOVASCULAR:  Regular rate and rhythm.

ABDOMEN:  Large.  Nontender.  He does have his left leg wound is dressed.  The 
dressing is dry.  He is able to move all extremities.

 

LABORATORY DATA:  From today, sodium 138, potassium 4.4, BUN is 15, creatinine 
is 1.0.  White blood cell count is 15.9, hemoglobin and hematocrit 9.2 and 30.0 
respectively, platelets of 607.

 

ASSESSMENT:

1.  Status post crush injury.

2.  Large soft tissue injury status post irrigation, debridement, and skin 
graft placement.  Wound VAC has been removed.

3.  Acute traumatic pain is improving.

4.  Left popliteal DVT with IVC filter placement, currently on Eliquis.

5.  Hypertension that is incompletely controlled.

6.  Chronic kidney disease that is greatly improving.

 

PLAN:  We will continue to medically manage the patient overnight.  We have 
talked to case management, Orthopedics, Cardiology and Plastic Surgery and 
agreed for discharge in the morning. Need to confirm with [facility] plan for IVC 
filter removal.  I explained this to the patient.  The patient is okay with 
this plan.  We will continue with again pain control, early mobilization.  The 
patient is having bowel movements, tolerating the diet well and is in no acute 
distress.

 

[facility]

## 2018-09-28 VITALS — DIASTOLIC BLOOD PRESSURE: 90 MMHG | SYSTOLIC BLOOD PRESSURE: 159 MMHG | TEMPERATURE: 98.6 F

## 2018-09-28 RX ADMIN — NIFEDIPINE SCH MG: 60 TABLET, EXTENDED RELEASE ORAL at 08:48

## 2018-09-28 RX ADMIN — DOCUSATE SODIUM 50 MG AND SENNOSIDES 8.6 MG SCH TAB: 8.6; 5 TABLET, FILM COATED ORAL at 08:48

## 2018-10-02 NOTE — PQF
SAM ZAVALA RAMONA PA

[id number]                                                             [id number]

[id number]                             

                                   

CLINICAL DOCUMENTATION CLARIFICATION FORM:  POST DISCHARGE



Addendum to original discharge summary date:  __________________________________
____



Late entry note date:  _________________________________________________________
__











DATE:    10/2/18                                     ATTN:  SUDHIR



Please exercise your independent, professional judgment in responding to the 
clarification form. 

Clinical indicators are provided on the bottom of this form for your review



Please check appropriate box(s):

[  ] Rhabdomyolysis idiopathic

[  x] Rhabdomyolysis due to trauma

[  ] Other diagnosis ___________

[  ] Unable to determine

In addition, please specify:

Present on Admission (POA):  [  x] Yes             [  ] No             [  ] 
Unable to determine



For continuity of documentation, please document condition throughout progress 
notes and discharge summary.  Thank You.



CLINICAL INDICATORS - SIGNS / SYMPTOMS / LABS

documentation of rhabdomyolysis



RISK FACTORS

crushing injury

laceration



TREATMENTS:   

skin graft

excisional debridement of muscle

wound vac

IV fluids

IV Antibiotics









(This form is maintained as a part of the permanent medical record)

2015 [employer]  All Rights Reserved

[email or web address]    [phone]

                                                              

LOGAN

## 2018-10-02 NOTE — PQF
SAM ZAVALA
URSULA

[id number]                                                             [id number]

[id number]                             

                                   

CLINICAL DOCUMENTATION CLARIFICATION FORM:  POST DISCHARGE



DATE:  10/2/18                                 ATTN:  Dr Kahn



Please exercise your independent, professional judgment in responding to the 
clarification form. 

Clinical indicators are provided on the bottom of this form for your review



Please check appropriate box(s)



[x  ] Acute Renal Failure (ARF) / Acute Kidney Injury (DONNA)   



[  ] Acute on Chronic Renal Failure please specify Stage of CKD ________ (see 
below)



[  ] CKD without ARF/DONNA please specify Stage of CKD__________



[  ] ESRD



[  ] Hypertensive CKD with CHF, Stage of CKD _____________________

HEART FAILURE:

A.   TYPE:

      [  ] Systolic / HFrEF      [ x] Diastolic / HFpEF       [  ] Combined 
Systolic / Diastolic

B.   ACUITY



[  ] Acute          [  ] Acute on Chronic          [  x] Chronic   

[  ] Other diagnosis ___________

[  ] Unable to determine



In addition, please specify:

Present on Admission (POA):  [  ] Yes             [  ] No             [  ] 
Unable to determine



[employer] Kid[employer] Guidelines for CKD Staging

Stage I    Kidney damage with normal or increased GFR   GFR > 90

Stage II   Kidney damage with mildly decreased GFR   GFR 60-89

Stage III    Kidney damage with moderately decreased GFR   GFR 30-59

Stage IV   Kidney damage with severely decreased GFR   GFR 16-29

Stage V   Kidney failure   GFR<15

ESRD   End Stage Renal Disease   On dialysis

__________________________________________________________________

Acute Renal Failure/Acute Kidney Failure defined as:

Increases in SCr by (>) 0.3 mg/dl within 48 hours OR-

Increases in SCr by (>) 1.5 times baseline, known or presumed to have occurred 
within the prior 7 days OR-

Urine volume < 0.5 ml/kg/hour for 6 hours (KDIGO supplement 2012 for RIFLE/AKIN 
criteria)





For continuity of documentation, please document condition throughout progress 
notes and discharge summary.  Thank You.

                                                  





CLINICAL INDICATORS - SIGNS / SYMPTOMS / LABS

Prolonged QT

Elevated CK

Documentation of CKD

Abnormal labs (BUN, creatinine, K+, creatinine clearance) Creatinines 1.2-1.6, 
BUN less than 4

Left Ventricular Hypertrophy

HTN, poorly controlled

documentation of diastolic dysfunction





RISK FACTORS

HTN Poorly controlled





TREATMENTS:

IV fluid challenge result

scheduled outpt stress test













(This form is maintained as a part of the permanent medical record)

2015 [employer]  All Rights Reserved

[email or web address]    [phone]

                                                              

LOGAN

## 2018-10-04 ENCOUNTER — HOSPITAL ENCOUNTER (OUTPATIENT)
Dept: HOSPITAL 92 - SDC | Age: 53
Discharge: HOME | End: 2018-10-04
Attending: THORACIC SURGERY (CARDIOTHORACIC VASCULAR SURGERY)
Payer: COMMERCIAL

## 2018-10-04 DIAGNOSIS — Z86.718: ICD-10-CM

## 2018-10-04 DIAGNOSIS — Z98.890: ICD-10-CM

## 2018-10-04 DIAGNOSIS — Z45.89: Primary | ICD-10-CM

## 2018-10-04 PROCEDURE — 99152 MOD SED SAME PHYS/QHP 5/>YRS: CPT

## 2018-10-04 PROCEDURE — 76942 ECHO GUIDE FOR BIOPSY: CPT

## 2018-10-04 PROCEDURE — C1769 GUIDE WIRE: HCPCS

## 2018-10-04 PROCEDURE — 06PY3DZ REMOVAL OF INTRALUMINAL DEVICE FROM LOWER VEIN, PERCUTANEOUS APPROACH: ICD-10-PCS | Performed by: THORACIC SURGERY (CARDIOTHORACIC VASCULAR SURGERY)

## 2018-10-04 PROCEDURE — 37193 REM ENDOVAS VENA CAVA FILTER: CPT

## 2019-02-22 ENCOUNTER — HOSPITAL ENCOUNTER (OUTPATIENT)
Dept: HOSPITAL 92 - ULT | Age: 54
Discharge: HOME | End: 2019-02-22
Attending: INTERNAL MEDICINE
Payer: COMMERCIAL

## 2019-02-22 DIAGNOSIS — N28.1: ICD-10-CM

## 2019-02-22 DIAGNOSIS — K76.0: ICD-10-CM

## 2019-02-22 DIAGNOSIS — K80.20: ICD-10-CM

## 2019-02-22 DIAGNOSIS — F52.21: ICD-10-CM

## 2019-02-22 DIAGNOSIS — I10: Primary | ICD-10-CM

## 2019-02-22 PROCEDURE — 76700 US EXAM ABDOM COMPLETE: CPT

## 2019-02-22 PROCEDURE — 76770 US EXAM ABDO BACK WALL COMP: CPT

## 2019-02-22 NOTE — ULT
RENAL DOPPLER EVALUATION WITH SPECTRAL ANALYSIS AND COLOR FLOW EVALUATION:

 

DATE: 2/22/2019.

 

FINDINGS/IMPRESSION: 

This exam was dictated in conjunction with the renal ultrasound, and please see that report for [facility]
er details.

 

POS: C

## 2019-02-22 NOTE — ULT
RENAL SONOGRPAM

RENAL DOPPLER EVALUATION WITH SPECTRAL ANALYSIS AND COLOR FLOW EVALUATION:

 

DATE: 2/22/2019.

 

HISTORY: 

Hypertension.

 

FINDINGS: 

The right kidney measures 10.9 cm x 7.5 cm x 7 cm.  There is a 1.5 cm anechoic cystic lesion seen wit
hin the superior pole right kidney which demonstrates sonographic characteristics compatible with a c
yst.

 

No renal calculus or hydronephrosis is seen involving the right kidney.

 

The left kidney demonstrates a normal sonographic appearance and measures 12.1 cm x 5.2 cm.  There is
 no evidence of a renal mass, renal calculus, or hydronephrosis involving the left kidney.  

 

Imaging of the right kidney demonstrates shadowing echogenic foci within the gallbladder lumen compat
ible with gallbladder calculi.  In addition, the visualized right hepatic lobe demonstrates increased
 echogenicity likely attributable to diffuse fatty infiltration.

 

The urinary bladder is incompletely distended but does demonstrate a grossly normal sonographic appea
cris.

 

RENAL ARTERY DOPPLER EVALUATION WITH SPECTRAL ANALYSIS AND COLOR FLOW:

The peak systolic velocity in the abdominal aorta is 63 cm/s.  The peak systolic velocity in the righ
t renal artery is 114 cm/s, and the peak systolic velocity in the left renal artery is 87 cm/s.  A ri
ght renal to aorta ratio of 1.8 was obtained with left renal artery to aorta ratio of 1.3.

 

Resistive indices in the left renal arcuate arteries range from 0.64 to 0.69 and on the let range fro
m 0.67 to 0.69.  Normal resistive indices are less than 0.7.

 

IMPRESSION: 

1.  Small right renal cyst.  The kidneys otherwise demonstrate a normal sonographic appearance bilate
rally without evidence of hydronephrosis.

 

2.  Normal renal artery to aorta ratios as well as normal resistive indices in the arcuate arteries.

 

3.  Cholelithiasis.

 

4.  Fatty infiltration of the liver.

 

POS: [facility]

## 2022-08-29 NOTE — OP
DATE OF PROCEDURE:  10/04/2018

 

PROCEDURE:  Removal of Optease temporary inferior vena cava filter.

 

PREOPERATIVE DIAGNOSIS:  Status post temporary IVC filter placement.

 

POSTOPERATIVE DIAGNOSIS:  Status post temporary filter placement.  

 

SURGEON:  Dr. Freddie Mittal 

 

ANESTHESIA:  1%  lidocaine local anesthesia with intravenous sedation consisting of 1 mg of Versed an
d 25 mcg of fentanyl.

 

INDICATIONS:  The patient is a young, otherwise healthy man who had an industrial accident with a res
ultant large soft tissue defect on his left thigh that required a skin graft coverage, he developed a
 DVT in his left lower extremity prior to that and a temporary filter was placed in his inferior vena
 cava to prophylax against pulmonary embolism during a brief period of contraindication to anticoagul
ation.  That period has now passed and he was returned to the cath lab for removal of the filter.

 

FINDINGS:  

1.  No thrombus in the vena cava, filter removed intact.

2.  4 mL of Isovue contrast and 4 minutes of fluoroscopy time utilized.

 

NARRATIVE REPORT:  After informed consent was obtained, the patient was positioned on the cath lab ta
ble and his right groin was prepped and draped in sterile fashion.  Ultrasound was used to confirm co
mpressibility of the right common femoral vein.  1% lidocaine was used to infiltrate the skin and sub
cutaneous tissues at that level and ultrasound was used to cannulate the femoral vein with a needle t
hrough which a wire was passed.  The wire was guided cephalad under fluoroscopy.  The tract was dilat
ed and a sheath was introduced.  Inferior vena cava venography demonstrated the absence of any thromb
us within the cava or filter.  A lasso was introduced through the sheath and used to lasso the hook o
n the inferior tip of the filter, when that had been accomplished, the sheath was advanced over the l
asso to secure it and then the lasso was withdrawn and the sheath advanced to dislodge the filter int
o collapse, the collapsed filter was then withdrawn into the sheath and the sheath removed and pressu
re held in the groin to achieve hemostasis.  Examination of the filter showed that it was removed int
act.
many years ago normal per pt

## 2025-06-16 NOTE — DIS
DATE OF ADMISSION:  09/13/2018

 

DATE OF DISCHARGE:  09/28/2018

 

ADMISSION DIAGNOSES:

1.  Status post industrial accident.

2.  Large left thigh laceration.

3.  Acute traumatic pain.

4.  Hypertension.

5.  Prolonged QT.

6.  Elevated CK.

 

DISCHARGE DIAGNOSES:

1.  Status post industrial accident.

2.  Large left thigh laceration.

3.  Acute traumatic pain.

4.  Hypertension, improved.

5.  Prolonged QT.

6.  Elevated CK, resolved.

7.  Chronic kidney disease.

8.  Deep venous thrombosis, left popliteal vein.

9.  Status post inferior vena cava filter placement.

10.  Left ventricular hypertrophy.

11.  Diastolic dysfunction.

 

CONSULTANTS:  Dr. Alvarenga, Orthopedic Surgery; Dr. Kahn, Cardiology; Dr. Mittal, CTS; Dr. Mitchell miles, Plastic Surgery.

 

PROCEDURES:  On 09/13/2018, irrigation and debridement of left thigh wound with application of a woun
d VAC to left thigh, Dr. Wilson, Orthopedic Surgery.  On 09/18/2018, IVC filter placement with Dr. Mittal Cardiothoracic Surgery.  On 09/20/2018,

irrigation and debridement with Dr. Enriquez, Plastic Surgery.  On 09/23/2018, split thickness skin graft
ing to the left thigh, Dr. Enriquez, Plastic Surgery.

 

HOSPITAL COURSE:  Stan Bearden is a 53-year-old male, who presented to [facility] status post 
industrial accident involving a welding machine.  The patient was found to have a large left thigh wo
und, who was taken to the operating room later that day for irrigation and washout.  A wound VAC was 
placed at that time.  Incidentally, during his presentation, the patient was exceptionally hypertensi
ve with an abnormal EKG and mildly elevated creatine kinase and troponins.  The patient was seen and 
evaluated by Cardiology.  An echocardiogram was performed that showed evidence of infiltrative diseas
e with left ventricular hypertrophy and diastolic dysfunction.  The patient's blood pressure improved
 after starting blood pressure medications per Cardiology's directives.  Also, incidentally noted dur
ing his hospitalization, the patient had evidence of chronic kidney disease, although his creatinine 
did improve to 1.0 prior to discharge.  On 09/18/2018, the patient was noted to have increasing edema
 and swelling of his left lower extremity, a left venous ultrasound was performed which revealed a le
ft popliteal DVT.  The patient was started on a heparin drip.  Given the patient's large thigh wound 
and likely eventual need for skin grafting, CT Surgery was consulted and an IVC filter was placed.  T
he patient was seen and evaluated by Plastic Surgery.  He went for repeat irrigation and debridement 
on 09/20/2018.  The patient eventually received his skin graft on 09/23/2018.  On 09/25/2018, the pat
daniella's wound VAC was removed.  He was transitioned to p.o. anticoagulants.  On 09/28/2018, the patien
t was deemed medically stable for discharge.

 

DISCHARGE DISPOSITION:  Home.

 

DISCHARGE CONDITION:  Good.

 

PHYSICAL EXAMINATION:

VITAL SIGNS:  Temperature 98.6, pulse 86, respirations 16, O2 sat 97% on room air, blood pressure 159
/90.

GENERAL:  Well-developed male, in no acute distress, ambulating in room with crutches.

PULMONARY:  Normal work of breathing.  Symmetric rise.

CARDIOVASCULAR:  Regular rate and rhythm.

GASTROINTESTINAL:  Abdomen is soft, nontender, nondistended.

MUSCULOSKELETAL:  Dressings are clean, dry, and intact.  He does have a FLAKITO drain to the left lower ex
tremity.

NEUROLOGIC:  No focal deficit is noted.

 

DISCHARGE MEDICATIONS:  The patient was discharged home on over-the-counter Tylenol Extra Strength.  
He is to continue 10 mg Eliquis b.i.d. until 10/02/2018, at which time he will transition to Eliquis 
5 mg p.o. b.i.d.  Toprol-XL 50 mg p.o. daily, Procardia XL 60 mg p.o. daily, Ultram 50 mg p.o. q.6 ho
urs p.r.n. for severe breakthrough pain.

 

FOLLOWUP APPOINTMENTS:  The patient should follow up with Dr. Khan/Cardiology in approximately 7 da
ys.  He should follow up with Dr. Enriquez in approximately 2 weeks.  Wound care and dressing changes as 
instructed.  The patient is scheduled for an appointment with the cath lab next Tuesday and was infor
med of this appointment.  He was provided contact information for Dr. Mittal for any questions. 
 The patient does not need to follow up formally with Trauma Services at this time, but may call our 
office with any questions.  This is merely a summary of the patient's hospitalization.  For more in d
[facility] information, please see his medical record in its entirety. Is she able to send a pic through [employer]?